# Patient Record
Sex: MALE | NOT HISPANIC OR LATINO | URBAN - NONMETROPOLITAN AREA
[De-identification: names, ages, dates, MRNs, and addresses within clinical notes are randomized per-mention and may not be internally consistent; named-entity substitution may affect disease eponyms.]

---

## 2018-01-19 ENCOUNTER — OFFICE VISIT CONVERTED (OUTPATIENT)
Dept: FAMILY MEDICINE CLINIC | Age: 83
End: 2018-01-19
Attending: FAMILY MEDICINE

## 2018-03-02 ENCOUNTER — OFFICE VISIT CONVERTED (OUTPATIENT)
Dept: FAMILY MEDICINE CLINIC | Age: 83
End: 2018-03-02
Attending: FAMILY MEDICINE

## 2018-08-28 ENCOUNTER — OFFICE VISIT CONVERTED (OUTPATIENT)
Dept: FAMILY MEDICINE CLINIC | Age: 83
End: 2018-08-28
Attending: FAMILY MEDICINE

## 2018-09-28 ENCOUNTER — OFFICE VISIT CONVERTED (OUTPATIENT)
Dept: FAMILY MEDICINE CLINIC | Age: 83
End: 2018-09-28
Attending: FAMILY MEDICINE

## 2019-01-17 ENCOUNTER — OFFICE VISIT CONVERTED (OUTPATIENT)
Dept: FAMILY MEDICINE CLINIC | Age: 84
End: 2019-01-17
Attending: FAMILY MEDICINE

## 2019-01-17 ENCOUNTER — HOSPITAL ENCOUNTER (OUTPATIENT)
Dept: OTHER | Facility: HOSPITAL | Age: 84
Discharge: HOME | End: 2019-01-17
Attending: FAMILY MEDICINE

## 2019-01-17 LAB
ALBUMIN SERPL-MCNC: 3.9 G/DL (ref 3.5–5)
ALBUMIN/GLOB SERPL: 1.2 {RATIO} (ref 1.4–2.6)
ALP SERPL-CCNC: 103 U/L (ref 56–155)
ALT SERPL-CCNC: 19 U/L (ref 10–40)
ANION GAP SERPL CALC-SCNC: 19 MMOL/L (ref 8–19)
AST SERPL-CCNC: 13 U/L (ref 15–50)
BASOPHILS # BLD MANUAL: 0.04 10*3/UL (ref 0–0.2)
BASOPHILS NFR BLD MANUAL: 0.4 % (ref 0–3)
BILIRUB SERPL-MCNC: 0.18 MG/DL (ref 0.2–1.3)
BNP SERPL-MCNC: 648 PG/ML (ref 0–1800)
BUN SERPL-MCNC: 50 MG/DL (ref 5–25)
BUN/CREAT SERPL: 20 {RATIO} (ref 6–20)
CALCIUM SERPL-MCNC: 9 MG/DL (ref 8.7–10.4)
CHLORIDE SERPL-SCNC: 98 MMOL/L (ref 99–111)
CHOLEST SERPL-MCNC: 204 MG/DL (ref 107–200)
CHOLEST/HDLC SERPL: 6.2 {RATIO} (ref 3–6)
CONV CO2: 20 MMOL/L (ref 22–32)
CONV CREATININE URINE, RANDOM: 134.9 MG/DL (ref 10–300)
CONV MICROALBUM.,U,RANDOM: 73.7 MG/L (ref 0–20)
CONV TOTAL PROTEIN: 7.1 G/DL (ref 6.3–8.2)
CREAT UR-MCNC: 2.44 MG/DL (ref 0.7–1.2)
DEPRECATED RDW RBC AUTO: 41.1 FL
EOSINOPHIL # BLD MANUAL: 0.41 10*3/UL (ref 0–0.7)
EOSINOPHIL NFR BLD MANUAL: 4.1 % (ref 0–7)
ERYTHROCYTE [DISTWIDTH] IN BLOOD BY AUTOMATED COUNT: 12.3 % (ref 11.5–14.5)
EST. AVERAGE GLUCOSE BLD GHB EST-MCNC: 252 MG/DL
GFR SERPLBLD BASED ON 1.73 SQ M-ARVRAT: 27 ML/MIN/{1.73_M2}
GLOBULIN UR ELPH-MCNC: 3.2 G/DL (ref 2–3.5)
GLUCOSE SERPL-MCNC: 436 MG/DL (ref 70–99)
GRANS (ABSOLUTE): 6.68 10*3/UL (ref 2–8)
GRANS: 66.6 % (ref 30–85)
HBA1C MFR BLD: 10.4 % (ref 3.5–5.7)
HBA1C MFR BLD: 10.5 G/DL (ref 14–18)
HCT VFR BLD AUTO: 31.9 % (ref 42–52)
HDLC SERPL-MCNC: 33 MG/DL (ref 40–60)
IMM GRANULOCYTES # BLD: 0.05 10*3/UL (ref 0–0.54)
IMM GRANULOCYTES NFR BLD: 0.5 % (ref 0–0.43)
LDLC SERPL CALC-MCNC: 122 MG/DL (ref 70–100)
LYMPHOCYTES # BLD MANUAL: 1.81 10*3/UL (ref 1–5)
LYMPHOCYTES NFR BLD MANUAL: 10.3 % (ref 3–10)
MCH RBC QN AUTO: 30.2 PG (ref 27–31)
MCHC RBC AUTO-ENTMCNC: 32.9 G/DL (ref 33–37)
MCV RBC AUTO: 91.7 FL (ref 80–96)
MICROALBUMIN/CREAT UR: 54.6 MG/G{CRE} (ref 0–25)
MONOCYTES # BLD AUTO: 1.03 10*3/UL (ref 0.2–1.2)
OSMOLALITY SERPL CALC.SUM OF ELEC: 306 MOSM/KG (ref 273–304)
PLATELET # BLD AUTO: 200 10*3/UL (ref 130–400)
PMV BLD AUTO: 10.3 FL (ref 7.4–10.4)
POTASSIUM SERPL-SCNC: 5.4 MMOL/L (ref 3.5–5.3)
RBC # BLD AUTO: 3.48 10*6/UL (ref 4.7–6.1)
SODIUM SERPL-SCNC: 132 MMOL/L (ref 135–147)
TRIGL SERPL-MCNC: 245 MG/DL (ref 40–150)
VARIANT LYMPHS NFR BLD MANUAL: 18.1 % (ref 20–45)
VLDLC SERPL-MCNC: 49 MG/DL (ref 5–37)
WBC # BLD AUTO: 10.02 10*3/UL (ref 4.8–10.8)

## 2019-01-30 ENCOUNTER — CONVERSION ENCOUNTER (OUTPATIENT)
Dept: CARDIOLOGY | Facility: CLINIC | Age: 84
End: 2019-01-30
Attending: INTERNAL MEDICINE

## 2019-02-18 ENCOUNTER — OFFICE VISIT CONVERTED (OUTPATIENT)
Dept: FAMILY MEDICINE CLINIC | Age: 84
End: 2019-02-18
Attending: FAMILY MEDICINE

## 2019-02-18 ENCOUNTER — HOSPITAL ENCOUNTER (OUTPATIENT)
Dept: OTHER | Facility: HOSPITAL | Age: 84
Discharge: HOME | End: 2019-02-18
Attending: FAMILY MEDICINE

## 2019-02-18 LAB
ALBUMIN SERPL-MCNC: 3.9 G/DL (ref 3.5–5)
ALBUMIN/GLOB SERPL: 1.2 {RATIO} (ref 1.4–2.6)
ALP SERPL-CCNC: 97 U/L (ref 56–155)
ALT SERPL-CCNC: 25 U/L (ref 10–40)
ANION GAP SERPL CALC-SCNC: 21 MMOL/L (ref 8–19)
AST SERPL-CCNC: 15 U/L (ref 15–50)
BASOPHILS # BLD MANUAL: 0.05 10*3/UL (ref 0–0.2)
BASOPHILS NFR BLD MANUAL: 0.5 % (ref 0–3)
BILIRUB SERPL-MCNC: 0.29 MG/DL (ref 0.2–1.3)
BUN SERPL-MCNC: 53 MG/DL (ref 5–25)
BUN/CREAT SERPL: 19 {RATIO} (ref 6–20)
CALCIUM SERPL-MCNC: 9 MG/DL (ref 8.7–10.4)
CHLORIDE SERPL-SCNC: 99 MMOL/L (ref 99–111)
CONV CO2: 17 MMOL/L (ref 22–32)
CONV TOTAL PROTEIN: 7.1 G/DL (ref 6.3–8.2)
CREAT UR-MCNC: 2.73 MG/DL (ref 0.7–1.2)
DEPRECATED RDW RBC AUTO: 43.3 FL
EOSINOPHIL # BLD MANUAL: 0.32 10*3/UL (ref 0–0.7)
EOSINOPHIL NFR BLD MANUAL: 3.1 % (ref 0–7)
ERYTHROCYTE [DISTWIDTH] IN BLOOD BY AUTOMATED COUNT: 12.6 % (ref 11.5–14.5)
GFR SERPLBLD BASED ON 1.73 SQ M-ARVRAT: 24 ML/MIN/{1.73_M2}
GLOBULIN UR ELPH-MCNC: 3.2 G/DL (ref 2–3.5)
GLUCOSE SERPL-MCNC: 378 MG/DL (ref 70–99)
GRANS (ABSOLUTE): 6.8 10*3/UL (ref 2–8)
GRANS: 66.3 % (ref 30–85)
HBA1C MFR BLD: 9.8 G/DL (ref 14–18)
HCT VFR BLD AUTO: 30 % (ref 42–52)
IMM GRANULOCYTES # BLD: 0.06 10*3/UL (ref 0–0.54)
IMM GRANULOCYTES NFR BLD: 0.6 % (ref 0–0.43)
LYMPHOCYTES # BLD MANUAL: 1.93 10*3/UL (ref 1–5)
LYMPHOCYTES NFR BLD MANUAL: 10.7 % (ref 3–10)
MCH RBC QN AUTO: 30.3 PG (ref 27–31)
MCHC RBC AUTO-ENTMCNC: 32.7 G/DL (ref 33–37)
MCV RBC AUTO: 92.9 FL (ref 80–96)
MONOCYTES # BLD AUTO: 1.1 10*3/UL (ref 0.2–1.2)
OSMOLALITY SERPL CALC.SUM OF ELEC: 304 MOSM/KG (ref 273–304)
PLATELET # BLD AUTO: 240 10*3/UL (ref 130–400)
PMV BLD AUTO: 9.9 FL (ref 7.4–10.4)
POTASSIUM SERPL-SCNC: 5.3 MMOL/L (ref 3.5–5.3)
RBC # BLD AUTO: 3.23 10*6/UL (ref 4.7–6.1)
SODIUM SERPL-SCNC: 132 MMOL/L (ref 135–147)
VARIANT LYMPHS NFR BLD MANUAL: 18.8 % (ref 20–45)
WBC # BLD AUTO: 10.26 10*3/UL (ref 4.8–10.8)

## 2019-04-01 ENCOUNTER — OFFICE VISIT CONVERTED (OUTPATIENT)
Dept: FAMILY MEDICINE CLINIC | Age: 84
End: 2019-04-01
Attending: FAMILY MEDICINE

## 2019-05-06 ENCOUNTER — HOSPITAL ENCOUNTER (OUTPATIENT)
Dept: OTHER | Facility: HOSPITAL | Age: 84
Discharge: HOME | End: 2019-05-06
Attending: FAMILY MEDICINE

## 2019-05-06 ENCOUNTER — OFFICE VISIT CONVERTED (OUTPATIENT)
Dept: FAMILY MEDICINE CLINIC | Age: 84
End: 2019-05-06
Attending: FAMILY MEDICINE

## 2019-05-06 LAB
ALBUMIN SERPL-MCNC: 4 G/DL (ref 3.5–5)
ALBUMIN/GLOB SERPL: 1.3 {RATIO} (ref 1.4–2.6)
ALP SERPL-CCNC: 234 U/L (ref 56–155)
ALT SERPL-CCNC: 135 U/L (ref 10–40)
ANION GAP SERPL CALC-SCNC: 18 MMOL/L (ref 8–19)
AST SERPL-CCNC: 61 U/L (ref 15–50)
BASOPHILS # BLD MANUAL: 0.04 10*3/UL (ref 0–0.2)
BASOPHILS NFR BLD MANUAL: 0.4 % (ref 0–3)
BILIRUB SERPL-MCNC: 0.36 MG/DL (ref 0.2–1.3)
BUN SERPL-MCNC: 36 MG/DL (ref 5–25)
BUN/CREAT SERPL: 16 {RATIO} (ref 6–20)
CALCIUM SERPL-MCNC: 9.1 MG/DL (ref 8.7–10.4)
CHLORIDE SERPL-SCNC: 103 MMOL/L (ref 99–111)
CHOLEST SERPL-MCNC: 139 MG/DL (ref 107–200)
CHOLEST/HDLC SERPL: 2.2 {RATIO} (ref 3–6)
CONV CO2: 20 MMOL/L (ref 22–32)
CONV CREATININE URINE, RANDOM: 92.1 MG/DL (ref 10–300)
CONV MICROALBUM.,U,RANDOM: 469.4 MG/L (ref 0–20)
CONV TOTAL PROTEIN: 7.2 G/DL (ref 6.3–8.2)
CREAT UR-MCNC: 2.26 MG/DL (ref 0.7–1.2)
DEPRECATED RDW RBC AUTO: 46.7 FL
EOSINOPHIL # BLD MANUAL: 0.42 10*3/UL (ref 0–0.7)
EOSINOPHIL NFR BLD MANUAL: 4.3 % (ref 0–7)
ERYTHROCYTE [DISTWIDTH] IN BLOOD BY AUTOMATED COUNT: 13.6 % (ref 11.5–14.5)
EST. AVERAGE GLUCOSE BLD GHB EST-MCNC: 143 MG/DL
GFR SERPLBLD BASED ON 1.73 SQ M-ARVRAT: 30 ML/MIN/{1.73_M2}
GLOBULIN UR ELPH-MCNC: 3.2 G/DL (ref 2–3.5)
GLUCOSE SERPL-MCNC: 205 MG/DL (ref 70–99)
GRANS (ABSOLUTE): 6.77 10*3/UL (ref 2–8)
GRANS: 68.5 % (ref 30–85)
HBA1C MFR BLD: 6.6 % (ref 3.5–5.7)
HBA1C MFR BLD: 9.2 G/DL (ref 14–18)
HCT VFR BLD AUTO: 29.1 % (ref 42–52)
HDLC SERPL-MCNC: 64 MG/DL (ref 40–60)
IMM GRANULOCYTES # BLD: 0.05 10*3/UL (ref 0–0.54)
IMM GRANULOCYTES NFR BLD: 0.5 % (ref 0–0.43)
LDLC SERPL CALC-MCNC: 57 MG/DL (ref 70–100)
LYMPHOCYTES # BLD MANUAL: 1.41 10*3/UL (ref 1–5)
LYMPHOCYTES NFR BLD MANUAL: 12 % (ref 3–10)
MCH RBC QN AUTO: 29.5 PG (ref 27–31)
MCHC RBC AUTO-ENTMCNC: 31.6 G/DL (ref 33–37)
MCV RBC AUTO: 93.3 FL (ref 80–96)
MICROALBUMIN/CREAT UR: 509.7 MG/G{CRE} (ref 0–25)
MONOCYTES # BLD AUTO: 1.18 10*3/UL (ref 0.2–1.2)
OSMOLALITY SERPL CALC.SUM OF ELEC: 296 MOSM/KG (ref 273–304)
PLATELET # BLD AUTO: 224 10*3/UL (ref 130–400)
PMV BLD AUTO: 9.9 FL (ref 7.4–10.4)
POTASSIUM SERPL-SCNC: 4.8 MMOL/L (ref 3.5–5.3)
RBC # BLD AUTO: 3.12 10*6/UL (ref 4.7–6.1)
SODIUM SERPL-SCNC: 136 MMOL/L (ref 135–147)
TRIGL SERPL-MCNC: 90 MG/DL (ref 40–150)
TSH SERPL-ACNC: 0.93 M[IU]/L (ref 0.27–4.2)
VARIANT LYMPHS NFR BLD MANUAL: 14.3 % (ref 20–45)
VLDLC SERPL-MCNC: 18 MG/DL (ref 5–37)
WBC # BLD AUTO: 9.87 10*3/UL (ref 4.8–10.8)

## 2019-05-07 ENCOUNTER — OFFICE VISIT CONVERTED (OUTPATIENT)
Dept: CARDIOLOGY | Facility: CLINIC | Age: 84
End: 2019-05-07
Attending: INTERNAL MEDICINE

## 2019-05-07 ENCOUNTER — CONVERSION ENCOUNTER (OUTPATIENT)
Dept: CARDIOLOGY | Facility: CLINIC | Age: 84
End: 2019-05-07

## 2019-05-10 ENCOUNTER — OFFICE VISIT CONVERTED (OUTPATIENT)
Dept: FAMILY MEDICINE CLINIC | Age: 84
End: 2019-05-10
Attending: FAMILY MEDICINE

## 2019-05-10 ENCOUNTER — HOSPITAL ENCOUNTER (OUTPATIENT)
Dept: OTHER | Facility: HOSPITAL | Age: 84
Discharge: HOME | End: 2019-05-10
Attending: FAMILY MEDICINE

## 2019-07-15 ENCOUNTER — HOSPITAL ENCOUNTER (OUTPATIENT)
Dept: OTHER | Facility: HOSPITAL | Age: 84
Discharge: HOME | End: 2019-07-15
Attending: INTERNAL MEDICINE

## 2019-07-15 LAB
25(OH)D3 SERPL-MCNC: 15 NG/ML (ref 30–100)
ALBUMIN SERPL-MCNC: 4.1 G/DL (ref 3.5–5)
ALBUMIN/GLOB SERPL: 1.2 {RATIO} (ref 1.4–2.6)
ALP SERPL-CCNC: 156 U/L (ref 56–155)
ALT SERPL-CCNC: 57 U/L (ref 10–40)
ANION GAP SERPL CALC-SCNC: 19 MMOL/L (ref 8–19)
APPEARANCE UR: ABNORMAL
AST SERPL-CCNC: 26 U/L (ref 15–50)
BASOPHILS # BLD MANUAL: 0.07 10*3/UL (ref 0–0.2)
BASOPHILS NFR BLD MANUAL: 0.5 % (ref 0–3)
BILIRUB SERPL-MCNC: 0.45 MG/DL (ref 0.2–1.3)
BILIRUB UR QL: NEGATIVE
BUN SERPL-MCNC: 28 MG/DL (ref 5–25)
BUN/CREAT SERPL: 15 {RATIO} (ref 6–20)
CALCIUM SERPL-MCNC: 9.4 MG/DL (ref 8.7–10.4)
CHLORIDE SERPL-SCNC: 97 MMOL/L (ref 99–111)
COLOR UR: YELLOW
CONV CO2: 23 MMOL/L (ref 22–32)
CONV CREATININE URINE, RANDOM: 75.6 MG/DL (ref 10–300)
CONV LEUKOCYTE ESTERASE: ABNORMAL
CONV MICROALBUM.,U,RANDOM: 2501.3 MG/L (ref 0–20)
CONV PROTEIN TO CREATININE RATIO (RANDOM URINE): 5.01 {RATIO} (ref 0–0.1)
CONV TOTAL PROTEIN: 7.5 G/DL (ref 6.3–8.2)
CONV UROBILINOGEN IN URINE BY AUTOMATED TEST STRIP: 0.2 {EHRLICHU}/DL (ref 0.1–1)
CREAT UR-MCNC: 1.91 MG/DL (ref 0.7–1.2)
DEPRECATED RDW RBC AUTO: 42.4 FL
EOSINOPHIL # BLD MANUAL: 0.27 10*3/UL (ref 0–0.7)
EOSINOPHIL NFR BLD MANUAL: 2 % (ref 0–7)
ERYTHROCYTE [DISTWIDTH] IN BLOOD BY AUTOMATED COUNT: 12.8 % (ref 11.5–14.5)
GFR SERPLBLD BASED ON 1.73 SQ M-ARVRAT: 37 ML/MIN/{1.73_M2}
GLOBULIN UR ELPH-MCNC: 3.4 G/DL (ref 2–3.5)
GLUCOSE 24H UR-MCNC: 500 MG/DL
GLUCOSE SERPL-MCNC: 353 MG/DL (ref 70–99)
GRANS (ABSOLUTE): 10.14 10*3/UL (ref 2–8)
GRANS: 76.5 % (ref 30–85)
HBA1C MFR BLD: 11.5 G/DL (ref 14–18)
HCT VFR BLD AUTO: 35.4 % (ref 42–52)
HGB UR QL STRIP: ABNORMAL
IMM GRANULOCYTES # BLD: 0.05 10*3/UL (ref 0–0.54)
IMM GRANULOCYTES NFR BLD: 0.4 % (ref 0–0.43)
IRON SATN MFR SERPL: 22 % (ref 20–55)
IRON SERPL-MCNC: 50 UG/DL (ref 70–180)
KETONES UR QL STRIP: NEGATIVE MG/DL
LYMPHOCYTES # BLD MANUAL: 1.61 10*3/UL (ref 1–5)
LYMPHOCYTES NFR BLD MANUAL: 8.5 % (ref 3–10)
MCH RBC QN AUTO: 29.6 PG (ref 27–31)
MCHC RBC AUTO-ENTMCNC: 32.5 G/DL (ref 33–37)
MCV RBC AUTO: 91 FL (ref 80–96)
MICROALBUMIN/CREAT UR: 3308.6 MG/G{CRE} (ref 0–25)
MONOCYTES # BLD AUTO: 1.13 10*3/UL (ref 0.2–1.2)
NITRITE UR-MCNC: NEGATIVE MG/ML
OSMOLALITY SERPL CALC.SUM OF ELEC: 298 MOSM/KG (ref 273–304)
PH UR STRIP.AUTO: 5.5 [PH] (ref 5–8)
PLATELET # BLD AUTO: 249 10*3/UL (ref 130–400)
PMV BLD AUTO: 10.1 FL (ref 7.4–10.4)
POTASSIUM SERPL-SCNC: 4.7 MMOL/L (ref 3.5–5.3)
PROT UR-MCNC: 378.5 MG/DL
PROT UR-MCNC: >=300 MG/DL
RBC # BLD AUTO: 3.89 10*6/UL (ref 4.7–6.1)
SODIUM SERPL-SCNC: 134 MMOL/L (ref 135–147)
SP GR UR STRIP: 1.02 (ref 1–1.03)
SPECIMEN SOURCE: ABNORMAL
TIBC SERPL-MCNC: 226 UG/DL (ref 245–450)
TRANSFERRIN SERPL-MCNC: 158 MG/DL (ref 215–365)
VARIANT LYMPHS NFR BLD MANUAL: 12.1 % (ref 20–45)
WBC # BLD AUTO: 13.27 10*3/UL (ref 4.8–10.8)

## 2019-08-26 ENCOUNTER — HOSPITAL ENCOUNTER (OUTPATIENT)
Dept: OTHER | Facility: HOSPITAL | Age: 84
Discharge: HOME | End: 2019-08-26
Attending: FAMILY MEDICINE

## 2019-08-26 ENCOUNTER — OFFICE VISIT CONVERTED (OUTPATIENT)
Dept: FAMILY MEDICINE CLINIC | Age: 84
End: 2019-08-26
Attending: FAMILY MEDICINE

## 2019-08-26 LAB
ALBUMIN SERPL-MCNC: 4.1 G/DL (ref 3.5–5)
ALBUMIN/GLOB SERPL: 1.2 {RATIO} (ref 1.4–2.6)
ALP SERPL-CCNC: 129 U/L (ref 56–155)
ALT SERPL-CCNC: 48 U/L (ref 10–40)
ANION GAP SERPL CALC-SCNC: 18 MMOL/L (ref 8–19)
AST SERPL-CCNC: 23 U/L (ref 15–50)
BASOPHILS # BLD MANUAL: 0.06 10*3/UL (ref 0–0.2)
BASOPHILS NFR BLD MANUAL: 0.6 % (ref 0–3)
BILIRUB SERPL-MCNC: 0.29 MG/DL (ref 0.2–1.3)
BNP SERPL-MCNC: 682 PG/ML (ref 0–1800)
BUN SERPL-MCNC: 47 MG/DL (ref 5–25)
BUN/CREAT SERPL: 19 {RATIO} (ref 6–20)
CALCIUM SERPL-MCNC: 9.3 MG/DL (ref 8.7–10.4)
CHLORIDE SERPL-SCNC: 98 MMOL/L (ref 99–111)
CONV CO2: 22 MMOL/L (ref 22–32)
CONV TOTAL PROTEIN: 7.5 G/DL (ref 6.3–8.2)
CREAT UR-MCNC: 2.42 MG/DL (ref 0.7–1.2)
DEPRECATED RDW RBC AUTO: 42 FL
EOSINOPHIL # BLD MANUAL: 0.57 10*3/UL (ref 0–0.7)
EOSINOPHIL NFR BLD MANUAL: 5.8 % (ref 0–7)
ERYTHROCYTE [DISTWIDTH] IN BLOOD BY AUTOMATED COUNT: 12.8 % (ref 11.5–14.5)
EST. AVERAGE GLUCOSE BLD GHB EST-MCNC: 223 MG/DL
GFR SERPLBLD BASED ON 1.73 SQ M-ARVRAT: 28 ML/MIN/{1.73_M2}
GLOBULIN UR ELPH-MCNC: 3.4 G/DL (ref 2–3.5)
GLUCOSE SERPL-MCNC: 339 MG/DL (ref 70–99)
GRANS (ABSOLUTE): 6.68 10*3/UL (ref 2–8)
GRANS: 68.1 % (ref 30–85)
HBA1C MFR BLD: 9.4 % (ref 3.5–5.7)
HBA1C MFR BLD: 9.8 G/DL (ref 14–18)
HCT VFR BLD AUTO: 30 % (ref 42–52)
IMM GRANULOCYTES # BLD: 0.04 10*3/UL (ref 0–0.54)
IMM GRANULOCYTES NFR BLD: 0.4 % (ref 0–0.43)
LYMPHOCYTES # BLD MANUAL: 1.26 10*3/UL (ref 1–5)
LYMPHOCYTES NFR BLD MANUAL: 12.3 % (ref 3–10)
MCH RBC QN AUTO: 29.7 PG (ref 27–31)
MCHC RBC AUTO-ENTMCNC: 32.7 G/DL (ref 33–37)
MCV RBC AUTO: 90.9 FL (ref 80–96)
MONOCYTES # BLD AUTO: 1.21 10*3/UL (ref 0.2–1.2)
OSMOLALITY SERPL CALC.SUM OF ELEC: 302 MOSM/KG (ref 273–304)
PLATELET # BLD AUTO: 211 10*3/UL (ref 130–400)
PMV BLD AUTO: 9.9 FL (ref 7.4–10.4)
POTASSIUM SERPL-SCNC: 4.5 MMOL/L (ref 3.5–5.3)
RBC # BLD AUTO: 3.3 10*6/UL (ref 4.7–6.1)
SODIUM SERPL-SCNC: 133 MMOL/L (ref 135–147)
VARIANT LYMPHS NFR BLD MANUAL: 12.8 % (ref 20–45)
WBC # BLD AUTO: 9.82 10*3/UL (ref 4.8–10.8)

## 2019-09-23 ENCOUNTER — HOSPITAL ENCOUNTER (OUTPATIENT)
Dept: OTHER | Facility: HOSPITAL | Age: 84
Discharge: HOME | End: 2019-09-23
Attending: FAMILY MEDICINE

## 2019-09-23 ENCOUNTER — OFFICE VISIT CONVERTED (OUTPATIENT)
Dept: FAMILY MEDICINE CLINIC | Age: 84
End: 2019-09-23
Attending: FAMILY MEDICINE

## 2019-09-23 LAB
ALBUMIN SERPL-MCNC: 4.1 G/DL (ref 3.5–5)
ALBUMIN/GLOB SERPL: 1.1 {RATIO} (ref 1.4–2.6)
ALP SERPL-CCNC: 122 U/L (ref 56–155)
ALT SERPL-CCNC: 45 U/L (ref 10–40)
ANION GAP SERPL CALC-SCNC: 25 MMOL/L (ref 8–19)
AST SERPL-CCNC: 23 U/L (ref 15–50)
BASOPHILS # BLD MANUAL: 0.05 10*3/UL (ref 0–0.2)
BASOPHILS NFR BLD MANUAL: 0.4 % (ref 0–3)
BILIRUB SERPL-MCNC: 0.7 MG/DL (ref 0.2–1.3)
BNP SERPL-MCNC: 385 PG/ML (ref 0–1800)
BUN SERPL-MCNC: 107 MG/DL (ref 5–25)
BUN/CREAT SERPL: 26 {RATIO} (ref 6–20)
CALCIUM SERPL-MCNC: 9.4 MG/DL (ref 8.7–10.4)
CHLORIDE SERPL-SCNC: 88 MMOL/L (ref 99–111)
CONV CO2: 17 MMOL/L (ref 22–32)
CONV TOTAL PROTEIN: 7.8 G/DL (ref 6.3–8.2)
CREAT UR-MCNC: 4.05 MG/DL (ref 0.7–1.2)
DEPRECATED RDW RBC AUTO: 43.4 FL
EOSINOPHIL # BLD MANUAL: 0.67 10*3/UL (ref 0–0.7)
EOSINOPHIL NFR BLD MANUAL: 5 % (ref 0–7)
ERYTHROCYTE [DISTWIDTH] IN BLOOD BY AUTOMATED COUNT: 12.8 % (ref 11.5–14.5)
GFR SERPLBLD BASED ON 1.73 SQ M-ARVRAT: 15 ML/MIN/{1.73_M2}
GLOBULIN UR ELPH-MCNC: 3.7 G/DL (ref 2–3.5)
GLUCOSE SERPL-MCNC: 554 MG/DL (ref 70–99)
GRANS (ABSOLUTE): 10.13 10*3/UL (ref 2–8)
GRANS: 75.3 % (ref 30–85)
HBA1C MFR BLD: 9.3 G/DL (ref 14–18)
HCT VFR BLD AUTO: 28.5 % (ref 42–52)
IMM GRANULOCYTES # BLD: 0.1 10*3/UL (ref 0–0.54)
IMM GRANULOCYTES NFR BLD: 0.7 % (ref 0–0.43)
LYMPHOCYTES # BLD MANUAL: 1.26 10*3/UL (ref 1–5)
LYMPHOCYTES NFR BLD MANUAL: 9.2 % (ref 3–10)
MCH RBC QN AUTO: 30 PG (ref 27–31)
MCHC RBC AUTO-ENTMCNC: 32.6 G/DL (ref 33–37)
MCV RBC AUTO: 91.9 FL (ref 80–96)
MONOCYTES # BLD AUTO: 1.23 10*3/UL (ref 0.2–1.2)
OSMOLALITY SERPL CALC.SUM OF ELEC: 319 MOSM/KG (ref 273–304)
PLATELET # BLD AUTO: 236 10*3/UL (ref 130–400)
PMV BLD AUTO: 10.8 FL (ref 7.4–10.4)
POTASSIUM SERPL-SCNC: 5.2 MMOL/L (ref 3.5–5.3)
RBC # BLD AUTO: 3.1 10*6/UL (ref 4.7–6.1)
SODIUM SERPL-SCNC: 125 MMOL/L (ref 135–147)
VARIANT LYMPHS NFR BLD MANUAL: 9.4 % (ref 20–45)
WBC # BLD AUTO: 13.44 10*3/UL (ref 4.8–10.8)

## 2019-11-12 ENCOUNTER — HOSPITAL ENCOUNTER (OUTPATIENT)
Dept: OTHER | Facility: HOSPITAL | Age: 84
Discharge: HOME | End: 2019-11-12
Attending: FAMILY MEDICINE

## 2019-11-12 ENCOUNTER — OFFICE VISIT CONVERTED (OUTPATIENT)
Dept: FAMILY MEDICINE CLINIC | Age: 84
End: 2019-11-12
Attending: FAMILY MEDICINE

## 2019-11-12 LAB
ALBUMIN SERPL-MCNC: 4.3 G/DL (ref 3.5–5)
ALBUMIN/GLOB SERPL: 1.3 {RATIO} (ref 1.4–2.6)
ALP SERPL-CCNC: 98 U/L (ref 56–155)
ALT SERPL-CCNC: 40 U/L (ref 10–40)
ANION GAP SERPL CALC-SCNC: 23 MMOL/L (ref 8–19)
AST SERPL-CCNC: 22 U/L (ref 15–50)
BASOPHILS # BLD AUTO: 0.06 10*3/UL (ref 0–0.2)
BASOPHILS NFR BLD AUTO: 0.6 % (ref 0–3)
BILIRUB SERPL-MCNC: 0.26 MG/DL (ref 0.2–1.3)
BNP SERPL-MCNC: 557 PG/ML (ref 0–1800)
BUN SERPL-MCNC: 70 MG/DL (ref 5–25)
BUN/CREAT SERPL: 21 {RATIO} (ref 6–20)
CALCIUM SERPL-MCNC: 9.4 MG/DL (ref 8.7–10.4)
CHLORIDE SERPL-SCNC: 99 MMOL/L (ref 99–111)
CONV ABS IMM GRAN: 0.04 10*3/UL (ref 0–0.2)
CONV CO2: 18 MMOL/L (ref 22–32)
CONV IMMATURE GRAN: 0.4 % (ref 0–1.8)
CONV TOTAL PROTEIN: 7.5 G/DL (ref 6.3–8.2)
CREAT UR-MCNC: 3.26 MG/DL (ref 0.7–1.2)
DEPRECATED RDW RBC AUTO: 44 FL (ref 35.1–43.9)
EOSINOPHIL # BLD AUTO: 0.7 10*3/UL (ref 0–0.7)
EOSINOPHIL # BLD AUTO: 6.7 % (ref 0–7)
ERYTHROCYTE [DISTWIDTH] IN BLOOD BY AUTOMATED COUNT: 12.2 % (ref 11.6–14.4)
EST. AVERAGE GLUCOSE BLD GHB EST-MCNC: 171 MG/DL
FERRITIN SERPL-MCNC: 682 NG/ML (ref 30–300)
FOLATE SERPL-MCNC: 10 NG/ML (ref 4.8–20)
GFR SERPLBLD BASED ON 1.73 SQ M-ARVRAT: 19 ML/MIN/{1.73_M2}
GLOBULIN UR ELPH-MCNC: 3.2 G/DL (ref 2–3.5)
GLUCOSE SERPL-MCNC: 191 MG/DL (ref 70–99)
HBA1C MFR BLD: 7.6 % (ref 3.5–5.7)
HCT VFR BLD AUTO: 31.5 % (ref 42–52)
HGB BLD-MCNC: 9.7 G/DL (ref 14–18)
IRON SATN MFR SERPL: 22 % (ref 20–55)
IRON SERPL-MCNC: 55 UG/DL (ref 70–180)
LYMPHOCYTES # BLD AUTO: 1.04 10*3/UL (ref 1–5)
LYMPHOCYTES NFR BLD AUTO: 10 % (ref 20–45)
MCH RBC QN AUTO: 30.2 PG (ref 27–31)
MCHC RBC AUTO-ENTMCNC: 30.8 G/DL (ref 33–37)
MCV RBC AUTO: 98.1 FL (ref 80–96)
MONOCYTES # BLD AUTO: 1.22 10*3/UL (ref 0.2–1.2)
MONOCYTES NFR BLD AUTO: 11.7 % (ref 3–10)
NEUTROPHILS # BLD AUTO: 7.39 10*3/UL (ref 2–8)
NEUTROPHILS NFR BLD AUTO: 70.6 % (ref 30–85)
NRBC CBCN: 0 % (ref 0–0.7)
OSMOLALITY SERPL CALC.SUM OF ELEC: 306 MOSM/KG (ref 273–304)
PLATELET # BLD AUTO: 194 10*3/UL (ref 130–400)
PMV BLD AUTO: 10.3 FL (ref 9.4–12.4)
POTASSIUM SERPL-SCNC: 4.9 MMOL/L (ref 3.5–5.3)
RBC # BLD AUTO: 3.21 10*6/UL (ref 4.7–6.1)
RETICS # AUTO: 0.08 10*6/UL (ref 0.03–0.1)
RETICS/RBC NFR AUTO: 2.56 % (ref 0.51–1.81)
SODIUM SERPL-SCNC: 135 MMOL/L (ref 135–147)
TIBC SERPL-MCNC: 249 UG/DL (ref 245–450)
TRANSFERRIN SERPL-MCNC: 174 MG/DL (ref 215–365)
TSH SERPL-ACNC: 1.12 M[IU]/L (ref 0.27–4.2)
VIT B12 SERPL-MCNC: 494 PG/ML (ref 211–911)
WBC # BLD AUTO: 10.45 10*3/UL (ref 4.8–10.8)

## 2019-12-17 ENCOUNTER — HOSPITAL ENCOUNTER (OUTPATIENT)
Dept: OTHER | Facility: HOSPITAL | Age: 84
Discharge: HOME | End: 2019-12-17
Attending: FAMILY MEDICINE

## 2019-12-17 ENCOUNTER — OFFICE VISIT CONVERTED (OUTPATIENT)
Dept: FAMILY MEDICINE CLINIC | Age: 84
End: 2019-12-17
Attending: FAMILY MEDICINE

## 2019-12-17 LAB
ALBUMIN SERPL-MCNC: 3.5 G/DL (ref 3.5–5)
ALBUMIN/GLOB SERPL: 1.1 {RATIO} (ref 1.4–2.6)
ALP SERPL-CCNC: 107 U/L (ref 56–155)
ALT SERPL-CCNC: 99 U/L (ref 10–40)
ANION GAP SERPL CALC-SCNC: 18 MMOL/L (ref 8–19)
AST SERPL-CCNC: 55 U/L (ref 15–50)
BASOPHILS # BLD MANUAL: 0.08 10*3/UL (ref 0–0.2)
BASOPHILS NFR BLD MANUAL: 0.5 % (ref 0–3)
BILIRUB SERPL-MCNC: 0.43 MG/DL (ref 0.2–1.3)
BUN SERPL-MCNC: 27 MG/DL (ref 5–25)
BUN/CREAT SERPL: 11 {RATIO} (ref 6–20)
CALCIUM SERPL-MCNC: 9.1 MG/DL (ref 8.7–10.4)
CHLORIDE SERPL-SCNC: 100 MMOL/L (ref 99–111)
CONV CO2: 23 MMOL/L (ref 22–32)
CONV TOTAL PROTEIN: 6.7 G/DL (ref 6.3–8.2)
CREAT UR-MCNC: 2.44 MG/DL (ref 0.7–1.2)
DEPRECATED RDW RBC AUTO: 46.4 FL
EOSINOPHIL # BLD MANUAL: 0.48 10*3/UL (ref 0–0.7)
EOSINOPHIL NFR BLD MANUAL: 2.8 % (ref 0–7)
ERYTHROCYTE [DISTWIDTH] IN BLOOD BY AUTOMATED COUNT: 13 % (ref 11.5–14.5)
GFR SERPLBLD BASED ON 1.73 SQ M-ARVRAT: 27 ML/MIN/{1.73_M2}
GLOBULIN UR ELPH-MCNC: 3.2 G/DL (ref 2–3.5)
GLUCOSE SERPL-MCNC: 177 MG/DL (ref 70–99)
GRANS (ABSOLUTE): 14.15 10*3/UL (ref 2–8)
GRANS: 81.7 % (ref 30–85)
HBA1C MFR BLD: 8.7 G/DL (ref 14–18)
HCT VFR BLD AUTO: 27.4 % (ref 42–52)
IMM GRANULOCYTES # BLD: 0.14 10*3/UL (ref 0–0.54)
IMM GRANULOCYTES NFR BLD: 0.8 % (ref 0–0.43)
LYMPHOCYTES # BLD MANUAL: 0.97 10*3/UL (ref 1–5)
LYMPHOCYTES NFR BLD MANUAL: 8.6 % (ref 3–10)
MCH RBC QN AUTO: 30.6 PG (ref 27–31)
MCHC RBC AUTO-ENTMCNC: 31.8 G/DL (ref 33–37)
MCV RBC AUTO: 96.5 FL (ref 80–96)
MONOCYTES # BLD AUTO: 1.48 10*3/UL (ref 0.2–1.2)
OSMOLALITY SERPL CALC.SUM OF ELEC: 293 MOSM/KG (ref 273–304)
PLATELET # BLD AUTO: 343 10*3/UL (ref 130–400)
PMV BLD AUTO: 10 FL (ref 7.4–10.4)
POTASSIUM SERPL-SCNC: 4 MMOL/L (ref 3.5–5.3)
RBC # BLD AUTO: 2.84 10*6/UL (ref 4.7–6.1)
SODIUM SERPL-SCNC: 137 MMOL/L (ref 135–147)
VARIANT LYMPHS NFR BLD MANUAL: 5.6 % (ref 20–45)
WBC # BLD AUTO: 17.3 10*3/UL (ref 4.8–10.8)

## 2020-01-27 ENCOUNTER — OFFICE VISIT CONVERTED (OUTPATIENT)
Dept: FAMILY MEDICINE CLINIC | Age: 85
End: 2020-01-27
Attending: FAMILY MEDICINE

## 2020-05-27 ENCOUNTER — OFFICE VISIT CONVERTED (OUTPATIENT)
Dept: FAMILY MEDICINE CLINIC | Age: 85
End: 2020-05-27
Attending: FAMILY MEDICINE

## 2020-05-27 ENCOUNTER — HOSPITAL ENCOUNTER (OUTPATIENT)
Dept: OTHER | Facility: HOSPITAL | Age: 85
Discharge: HOME | End: 2020-05-27
Attending: FAMILY MEDICINE

## 2020-05-27 LAB
ALBUMIN SERPL-MCNC: 4 G/DL (ref 3.5–5)
ALBUMIN/GLOB SERPL: 1.3 {RATIO} (ref 1.4–2.6)
ALP SERPL-CCNC: 125 U/L (ref 56–155)
ALT SERPL-CCNC: 50 U/L (ref 10–40)
ANION GAP SERPL CALC-SCNC: 16 MMOL/L (ref 8–19)
AST SERPL-CCNC: 22 U/L (ref 15–50)
BASOPHILS # BLD MANUAL: 0.05 10*3/UL (ref 0–0.2)
BASOPHILS NFR BLD MANUAL: 0.6 % (ref 0–3)
BILIRUB SERPL-MCNC: 0.41 MG/DL (ref 0.2–1.3)
BUN SERPL-MCNC: 28 MG/DL (ref 5–25)
BUN/CREAT SERPL: 11 {RATIO} (ref 6–20)
CALCIUM SERPL-MCNC: 9 MG/DL (ref 8.7–10.4)
CHLORIDE SERPL-SCNC: 101 MMOL/L (ref 99–111)
CONV CO2: 20 MMOL/L (ref 22–32)
CONV TOTAL PROTEIN: 7.2 G/DL (ref 6.3–8.2)
CREAT UR-MCNC: 2.46 MG/DL (ref 0.7–1.2)
DEPRECATED RDW RBC AUTO: 41.4 FL
EOSINOPHIL # BLD MANUAL: 0.29 10*3/UL (ref 0–0.7)
EOSINOPHIL NFR BLD MANUAL: 3.5 % (ref 0–7)
ERYTHROCYTE [DISTWIDTH] IN BLOOD BY AUTOMATED COUNT: 11.9 % (ref 11.5–14.5)
EST. AVERAGE GLUCOSE BLD GHB EST-MCNC: 169 MG/DL
GFR SERPLBLD BASED ON 1.73 SQ M-ARVRAT: 27 ML/MIN/{1.73_M2}
GLOBULIN UR ELPH-MCNC: 3.2 G/DL (ref 2–3.5)
GLUCOSE SERPL-MCNC: 246 MG/DL (ref 70–99)
GRANS (ABSOLUTE): 5.3 10*3/UL (ref 2–8)
GRANS: 64.2 % (ref 30–85)
HBA1C MFR BLD: 11.1 G/DL (ref 14–18)
HBA1C MFR BLD: 7.5 % (ref 3.5–5.7)
HCT VFR BLD AUTO: 33.9 % (ref 42–52)
IMM GRANULOCYTES # BLD: 0.03 10*3/UL (ref 0–0.54)
IMM GRANULOCYTES NFR BLD: 0.4 % (ref 0–0.43)
LYMPHOCYTES # BLD MANUAL: 1.66 10*3/UL (ref 1–5)
LYMPHOCYTES NFR BLD MANUAL: 11.2 % (ref 3–10)
MCH RBC QN AUTO: 30.4 PG (ref 27–31)
MCHC RBC AUTO-ENTMCNC: 32.7 G/DL (ref 33–37)
MCV RBC AUTO: 92.9 FL (ref 80–96)
MONOCYTES # BLD AUTO: 0.92 10*3/UL (ref 0.2–1.2)
OSMOLALITY SERPL CALC.SUM OF ELEC: 288 MOSM/KG (ref 273–304)
PLATELET # BLD AUTO: 196 10*3/UL (ref 130–400)
PMV BLD AUTO: 9.7 FL (ref 7.4–10.4)
POTASSIUM SERPL-SCNC: 4.6 MMOL/L (ref 3.5–5.3)
RBC # BLD AUTO: 3.65 10*6/UL (ref 4.7–6.1)
SODIUM SERPL-SCNC: 132 MMOL/L (ref 135–147)
TSH SERPL-ACNC: 1.04 M[IU]/L (ref 0.27–4.2)
VARIANT LYMPHS NFR BLD MANUAL: 20.1 % (ref 20–45)
WBC # BLD AUTO: 8.25 10*3/UL (ref 4.8–10.8)

## 2020-10-01 ENCOUNTER — HOSPITAL ENCOUNTER (OUTPATIENT)
Dept: OTHER | Facility: HOSPITAL | Age: 85
Discharge: HOME | End: 2020-10-01
Attending: FAMILY MEDICINE

## 2020-10-01 ENCOUNTER — OFFICE VISIT CONVERTED (OUTPATIENT)
Dept: FAMILY MEDICINE CLINIC | Age: 85
End: 2020-10-01
Attending: FAMILY MEDICINE

## 2020-10-01 LAB
BASOPHILS # BLD AUTO: 0.12 10*3/UL (ref 0–0.2)
BASOPHILS NFR BLD AUTO: 0.9 % (ref 0–3)
CONV ABS IMM GRAN: 0.08 10*3/UL (ref 0–0.2)
CONV IMMATURE GRAN: 0.6 % (ref 0–1.8)
DEPRECATED RDW RBC AUTO: 49 FL (ref 35.1–43.9)
EOSINOPHIL # BLD AUTO: 0.56 10*3/UL (ref 0–0.7)
EOSINOPHIL # BLD AUTO: 4.4 % (ref 0–7)
ERYTHROCYTE [DISTWIDTH] IN BLOOD BY AUTOMATED COUNT: 14.2 % (ref 11.6–14.4)
EST. AVERAGE GLUCOSE BLD GHB EST-MCNC: 157 MG/DL
HBA1C MFR BLD: 7.1 % (ref 3.5–5.7)
HCT VFR BLD AUTO: 28.9 % (ref 42–52)
HGB BLD-MCNC: 9 G/DL (ref 14–18)
LYMPHOCYTES # BLD AUTO: 1.96 10*3/UL (ref 1–5)
LYMPHOCYTES NFR BLD AUTO: 15.3 % (ref 20–45)
MCH RBC QN AUTO: 29.6 PG (ref 27–31)
MCHC RBC AUTO-ENTMCNC: 31.1 G/DL (ref 33–37)
MCV RBC AUTO: 95.1 FL (ref 80–96)
MONOCYTES # BLD AUTO: 1.09 10*3/UL (ref 0.2–1.2)
MONOCYTES NFR BLD AUTO: 8.5 % (ref 3–10)
NEUTROPHILS # BLD AUTO: 8.99 10*3/UL (ref 2–8)
NEUTROPHILS NFR BLD AUTO: 70.3 % (ref 30–85)
NRBC CBCN: 0 % (ref 0–0.7)
PLATELET # BLD AUTO: 215 10*3/UL (ref 130–400)
PMV BLD AUTO: 11.5 FL (ref 9.4–12.4)
RBC # BLD AUTO: 3.04 10*6/UL (ref 4.7–6.1)
RETICS # AUTO: 0.07 10*6/UL (ref 0.03–0.1)
RETICS/RBC NFR AUTO: 2.46 % (ref 0.51–1.81)
WBC # BLD AUTO: 12.8 10*3/UL (ref 4.8–10.8)

## 2020-10-02 LAB
ALBUMIN SERPL-MCNC: 3.9 G/DL (ref 3.5–5)
ALBUMIN/GLOB SERPL: 1.2 {RATIO} (ref 1.4–2.6)
ALP SERPL-CCNC: 142 U/L (ref 56–155)
ALT SERPL-CCNC: 36 U/L (ref 10–40)
ANION GAP SERPL CALC-SCNC: 19 MMOL/L (ref 8–19)
AST SERPL-CCNC: 22 U/L (ref 15–50)
BILIRUB SERPL-MCNC: 0.35 MG/DL (ref 0.2–1.3)
BNP SERPL-MCNC: 2156 PG/ML (ref 0–1800)
BUN SERPL-MCNC: 47 MG/DL (ref 5–25)
BUN/CREAT SERPL: 15 {RATIO} (ref 6–20)
CALCIUM SERPL-MCNC: 9 MG/DL (ref 8.7–10.4)
CHLORIDE SERPL-SCNC: 99 MMOL/L (ref 99–111)
CONV CO2: 21 MMOL/L (ref 22–32)
CONV TOTAL PROTEIN: 7.2 G/DL (ref 6.3–8.2)
CREAT UR-MCNC: 3.15 MG/DL (ref 0.7–1.2)
FERRITIN SERPL-MCNC: 619 NG/ML (ref 30–300)
FOLATE SERPL-MCNC: 13.4 NG/ML (ref 4.8–20)
GFR SERPLBLD BASED ON 1.73 SQ M-ARVRAT: 20 ML/MIN/{1.73_M2}
GLOBULIN UR ELPH-MCNC: 3.3 G/DL (ref 2–3.5)
GLUCOSE SERPL-MCNC: 212 MG/DL (ref 70–99)
IRON SATN MFR SERPL: 17 % (ref 20–55)
IRON SERPL-MCNC: 40 UG/DL (ref 70–180)
OSMOLALITY SERPL CALC.SUM OF ELEC: 297 MOSM/KG (ref 273–304)
POTASSIUM SERPL-SCNC: 5.2 MMOL/L (ref 3.5–5.3)
SODIUM SERPL-SCNC: 134 MMOL/L (ref 135–147)
TIBC SERPL-MCNC: 240 UG/DL (ref 245–450)
TRANSFERRIN SERPL-MCNC: 168 MG/DL (ref 215–365)
TSH SERPL-ACNC: 1.03 M[IU]/L (ref 0.27–4.2)
VIT B12 SERPL-MCNC: 1555 PG/ML (ref 211–911)

## 2021-05-15 VITALS
WEIGHT: 226 LBS | HEIGHT: 66 IN | DIASTOLIC BLOOD PRESSURE: 62 MMHG | SYSTOLIC BLOOD PRESSURE: 174 MMHG | HEART RATE: 68 BPM | BODY MASS INDEX: 36.32 KG/M2

## 2021-05-18 NOTE — PROGRESS NOTES
Carlos Delgado  1935     Office/Outpatient Visit    Visit Date: Tue, Dec 17, 2019 09:50 am    Provider: Tommy Garduno MD (Assistant: Alondra Magallanes MA)    Location: [facility]        Electronically signed by Tommy Garduno MD on  12/17/2019 08:22:26 PM                             Subjective:        CC: Mr. Delgado is a 84 year old Black or  male.  He is here today following a transition of care from an inpatient hospital: [facility]. The patient was admitted on 12/4-11/2019. The patient was admitted for Pneumonia. Our office called the patient within 48 hours of discharge and scheduled the follow-up appointment. During the patient's hospital stay the patient was treated by Dr. Alcala. Medications have been reviewed and reconciled with discharge summary..          HPI:       BP today is 124/58 with a HR of 74. Upon d/c from [facility], Dr. Escalante continued amlodipine, coreg, and imdur. He d/c'd clonidine, lasix, hydralazine, and lisinopril. Since BP was elevated back at [facility], I advised to restart lisinopril. Pt has also been on lasix and HCTZ 12.5 mg qd.      A1c this year has been 10.4 -> 6.6 -> 9.4 -> 7.6 on 11/12/19. His DM 2 medications were changed during hospitalization as he was not eating much during that time so glucose was controlled with SSI. Upon discharge Lantus 40 units BID and januvia were d/c'd. He was advised to continue glipizide 10 mg BID only. I was contacted by hospital nurse who was concerned about drastic cuts to DM 2 and HTN meds. I advised to restart Lantus 40 units BID. He is checking glucose BID and glucose tends to be low in the morning and in the 100-200s during day and night. He appetite has gradually improved. He has continued to take novolog 5 units TID.      Pt was admitted to [facility] from 12/4-12/11 (transferred from [facility]) for AMS 2/2 sepsis with bacteremia, UTI (e.coli) and RANJIT on CKD. Improved with IVF and rocephin. He met [facility]  criteria with WBC 28, temp of 102, and blood cultures (presumably from Flaget were positive for bacteria per [facility] D/c/ summary). Hospitalization was for life-threatening sepsis. CXR read as possible PNA vs atelectasis. He was discharged home with amoxicllin. He reports feeling pretty good. He has felt pretty other than the first couple days in the hospital.    ROS:     CONSTITUTIONAL:  Negative for fatigue and fever.      E/N/T:  Negative for diminished hearing and nasal congestion.      CARDIOVASCULAR:  Negative for chest pain and palpitations.      RESPIRATORY:  Positive for dyspnea ( with moderate exertion ).   Negative for recent cough.      GASTROINTESTINAL:  Positive for anorexia ( decreased appetite ), diarrhea and change in stool caliber.   Negative for abdominal pain, constipation, hematochezia ( resolved ), nausea or vomiting.      MUSCULOSKELETAL:  Negative for arthralgias and myalgias.      INTEGUMENTARY:  Positive for fungal nail infection (toenails).      NEUROLOGICAL:  Positive for paresthesias.   Negative for weakness.      PSYCHIATRIC:  Negative for anxiety, depression and sleep disturbance.          Past Medical History / Family History / Social History:         Last Reviewed on 11/12/2019 06:33 PM by Tommy Garduno    Past Medical History:             PAST MEDICAL HISTORY         Chronic Renal Failure: Stage 4;     Colon cancer         CURRENT MEDICAL PROVIDERS:    Nephrologist: Dr. rFausto         PREVENTIVE HEALTH MAINTENANCE             COLORECTAL CANCER SCREENING:; colonoscopy with the following abnormalities noted-- 1 tubular adenoma and Polyp(s); 6/17/19 - Dr. Mariscal         Surgical History:         Positive for    Cataract Removal: bilateral; ;     Positive for    Colonoscopy ( 2016;; );         Family History:     Family Medical History Unremarkable         Social History:     Occupation:    Retired         Tobacco/Alcohol/Supplements:     Last Reviewed on 11/12/2019 06:33 PM by  Tommy Garduno    Tobacco: He has a past history of cigarette smoking; quit date:  1975.  Non-drinker     Caffeine:  He admits to consuming caffeine via coffee ( 2 servings per day ).          Substance Abuse History:     Last Reviewed on 11/12/2019 06:33 PM by Tommy Garduno    None         Mental Health History:     Last Reviewed on 11/12/2019 06:33 PM by Tommy Garduno        Communicable Diseases (eg STDs):     Last Reviewed on 11/12/2019 06:33 PM by Tommy Garduno        Current Problems:     Last Reviewed on 11/12/2019 06:33 PM by Tommy Garduno    Type 2 diabetes mellitus with diabetic neuropathy, unspecified    Essential (primary) hypertension    Other atherosclerosis of native arteries of extremities, unspecified extremity    Cardiac murmur, unspecified    Other iron deficiency anemias    Constipation, unspecified    Type 2 diabetes mellitus with unspecified complications    Hereditary and idiopathic neuropathy, unspecified    Other long term (current) drug therapy    Cerebral ischemia    Chronic diastolic (congestive) heart failure    Chronic kidney disease, stage 4 (severe)    Anemia in chronic kidney disease    Unqualified visual loss, right eye, normal vision left eye    Encounter for follow-up examination after completed treatment for conditions other than malignant neoplasm    Personal history of other malignant neoplasm of large intestine        Immunizations:     None        Allergies:     Last Reviewed on 11/12/2019 06:33 PM by Tommy Garduno    No Known Allergies.        Current Medications:     Last Reviewed on 11/12/2019 06:33 PM by Tommy Garduno    amoxicillin 500 mg oral capsule [take 1 capsule (500 mg) by oral route 2 times per day]    aspirin 325 mg oral tablet [take 1 tablet (325 mg) by oral route every day]    Amlodipine  10 mg oral tablet [1 tab daily]    Glipizide 10mg Tablets [1 tab bid]    Accu-Chek Anna Plus Test Strips  Reagent Strips [check blood sugar bid  DX  "E11.9]    BD Ultra Fine II Lancet  Lancet [check BS bid  DX: E11.9]    BD Ultra-Fine Mini Pen Needle 31G x 3/16\"  Pen Needle [Use BID as directed]    Docusate Sodium 100 mg oral capsule [Take 1 capsule bid for constipation PRN]    Senna 8.6mg Tablet [Take 1 tablet(s) by mouth bid]    Lantus SoloSTAR 100units/1ml Injection [40 units sub-q bid]    carvedilol 6.25 mg oral tablet [TAKE 1 TABLET BY MOUTH TWICE DAILY]    NovoLog FlexPen 100units/1ml Injection [5 units before each meal]    atorvastatin 20 mg oral tablet [1 po q hs ]    Isosorbide Mononitrate 30mg Tablets, Extended Release [Take 1 tablet(s) by mouth qam]    Januvia 50mg Tablet [1 tab daily]    terbinafine HCl 250 mg oral tablet [take 1 tablet (250 mg) by oral route once daily]        Objective:        Vitals:         Current: 12/17/2019 9:59:15 AM    Ht:  5 ft, 6 in;  Wt: 207.2 lbs;  BMI: 33.4T: 98.5 F (oral);  BP: 124/58 mm Hg (left arm, sitting);  P: 74 bpm (left arm (BP Cuff), sitting);  sCr: 3.26 mg/dL;  GFR: 17.14        Exams:     PHYSICAL EXAM:     GENERAL: Vitals recorded well developed, well nourished;  well groomed;  no apparent distress;     E/N/T:  normal EACs, TMs, nasal/oral mucosa, teeth, gingiva, and oropharynx;     NECK: range of motion is normal; trachea is midline;     RESPIRATORY: normal respiratory rate and pattern with no distress; normal breath sounds with no rales, rhonchi, wheezes or rubs;     CARDIOVASCULAR: normal rate; rhythm is regular;  a systolic murmur is noted: it is grade 2/6;  trace pedal edema;     GASTROINTESTINAL: nontender; normal bowel sounds;     SKIN: onychomycosis of toe(s);     MUSCULOSKELETAL: gait: slowed and uses a cane;  normal overall tone     NEUROLOGIC: mental status: alert and oriented x 3; GROSSLY INTACT     PSYCHIATRIC: appropriate affect and demeanor; normal speech pattern;         Assessment:         I10   Essential (primary) hypertension       E11.40   Type 2 diabetes mellitus with diabetic " neuropathy, unspecified       A41.51   Sepsis due to Escherichia coli [E. coli]           ORDERS:         Meds Prescribed:       [Refilled] furosemide 20 mg oral tablet [take 1 tablet (20 mg) by oral route once per day], #90 (ninety) tablets, Refills: 1 (one)       [Refilled] Lantus Solostar U-100 Insulin 100 unit/mL (3 mL) subcutaneous Insulin Pen [40 units sub-q qAM and 20 units qHS], #15 (fifteen) milliliters, Refills: 2 (two)         Lab Orders:       [id number]  [facility] CBC with 3 part diff  (Send-Out)            [id number]  [facility] Comp. Metabolic Panel  (Send-Out)            APPTO  Appointment need  (In-House)                      Plan:         Essential (primary) hypertensionBP seems to be well controlled, our regimen now will be Imdur 30 mg qd, lisinopril 40 mg qd, coreg 6.25, amlodipine 10 mg qd, and lasix 20 mg qd. D/c hydralazine 100 mg TID, clonidine 0.2 mg BID, and HCTZ 25 mg qd. 8    LABORATORY:  Labs ordered to be performed today include CBC and Comprehensive metabolic panel.      FOLLOW-UP: Schedule a follow-up visit in 1 month.:.            Prescriptions:       [Refilled] furosemide 20 mg oral tablet [take 1 tablet (20 mg) by oral route once per day], #90 (ninety) tablets, Refills: 1 (one)           Orders:       [id number]  [facility] CBC with 3 part diff  (Send-Out)            [id number]  [facility] Comp. Metabolic Panel  (Send-Out)            APPTO  Appointment need  (In-House)              Type 2 diabetes mellitus with diabetic neuropathy, unspecifiedContinue Lantus 40 units qAM and 20 units qHS. Cont all other meds unchanged as before novolog 5 units TID, glipizide 10 mg BID, and januvia 50 mg qd.          Prescriptions:       [Refilled] Lantus Solostar U-100 Insulin 100 unit/mL (3 mL) subcutaneous Insulin Pen [40 units sub-q qAM and 20 units qHS], #15 (fifteen) milliliters, Refills: 2 (two)         Sepsis due to Escherichia coli [E. coli]Complete amoxicillin BID.            Patient Recommendations:         For  Essential (primary) hypertension:    Schedule a follow-up visit in 1 month.                APPOINTMENT INFORMATION:        Monday Tuesday Wednesday Thursday Friday Saturday Sunday            Time:___________________AM  PM   Date:_____________________             Charge Capture:         Primary Diagnosis:     I10  Essential (primary) hypertension           Orders:      [id number]  Transitional care manage service 7 day discharge  (In-House)            APPTO  Appointment need  (In-House)              E11.40  Type 2 diabetes mellitus with diabetic neuropathy, unspecified     A41.51  Sepsis due to Escherichia coli [E. coli]

## 2021-05-18 NOTE — PROGRESS NOTES
"Jr. Delgado Thomas 1935     Office/Outpatient Visit    Visit Date: Fri, Mar 2, 2018 08:44 am    Provider: Ezekiel Nava MD (Assistant: Fern Rosas MA)    Location: [facility]        Electronically signed by Ezekiel Nava MD on  03/02/2018 12:19:55 PM                             SUBJECTIVE:        CC:     Mr. Danny Jr. is a 82 year old Black or  male.  This is a follow-up visit.  6 WEEK FOLLOW UP; NEEDS METOPROLOL AND METFORMIN REFILLED         HPI: ?LANT DOSE    ?RESTART METFORMIN     LAST NEPHRO APPT     BLOOD    L KNEE         Patient to be evaluated for iDDM.  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  Compliance with treatment has been good.  Current meds include an oral hypoglycemic and insulin.  He does not perform home blood glucose monitoring.  Most recent lab results include.  Hemoglobin A1c:  8.9 (%) (02/01/2018), Foot Exam (Annual):  05/23/2017 (05/22/2017), Microalbumin, Urine, rand:  43.2 (mg/L) (09/15/2017),  74.2 (mg/L) (02/01/2018) In regard to preventative care, his last ophthalmology exam was in 2017.  HAD BEEN OFF METFOMMIN DUE TO RENAL ISSUES BUT NOW STATES HE IS TAKING IT AGAIN.      ROS:     CONSTITUTIONAL:  Negative for chills and fever.      E/N/T:  Negative for ear pain, nasal congestion and sore throat.      CARDIOVASCULAR:  Positive for pedal edema ( mild ).   Negative for chest pain or palpitations.      RESPIRATORY:  Negative for recent cough and dyspnea.      GASTROINTESTINAL:  Positive for SEVERAL EPISODES OF BRITHT RED BLOOD WITH HIS BM'S RECENTLY.  HISTORY OF PRIOR COLN CANCER.  LAST SCOPE 2016..   Negative for abdominal pain, nausea or vomiting.      MUSCULOSKELETAL:  Positive for arthralgias and (LEFT KNEE GETTING WORSE) LEGS \"BOWED\" SINCE BIRTH.      NEUROLOGICAL:  Positive for paresthesia ( LOWER LEGS FEEL \"TIRED\" ).   Negative for dizziness, headaches or weakness.          PMH/FMH/SH:     Last Reviewed on 3/02/2018 09:27 AM " by Ezekiel Nava    Past Medical History:             PAST MEDICAL HISTORY         Colon cancer         CURRENT MEDICAL PROVIDERS:    Nephrologist         Surgical History:         Positive for    Cataract Removal: bilateral; ;     Positive for    Colonoscopy ( 2016;; );         Family History:     Family Medical History Unremarkable         Social History:     Occupation:    Retired         Tobacco/Alcohol/Supplements:     Last Reviewed on 3/02/2018 09:27 AM by Ezekiel Nava    Tobacco: He has a past history of cigarette smoking; quit date:  1975.  Non-drinker     Caffeine:  He admits to consuming caffeine via coffee ( 2 servings per day ).          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         Current Medications:     Last Reviewed on 3/02/2018 09:28 AM by Ezekiel Nava    Glipizide 10mg Tablets 1 tab bid     BD Ultra-Fine 6mm Needle 31G Insulin Syringe 1ml Syringe use with lantus daily  DX E11.9     Amlodipine  10mg Tablet 1 tab daily     Clonidine HCl 0.2mg Tablets 1/2 to 1 tab po BID     Hydrochlorothiazide (HCTZ) 25mg Tablet 1 tab daily     Lisinopril 40mg Tablet 1 tab daily     Metoprolol Tartrate 25mg Tablet Take 1 tablet(s) by mouth twice daily     Lantus 100units/1ml Injection 50 units BID     Metformin HCl 500mg Tablet 1 tab bid         OBJECTIVE:        Vitals:         Current: 3/2/2018 8:48:23 AM     Ht:  5 ft, 6 in;  Wt: 199 lbs;  BMI: 32.1    T: 96.8 F (tympanic);  BP: 121/69 mm Hg (left arm, sitting);  P: 50 bpm (left arm (BP Cuff), sitting);  sCr: 1.96 mg/dL;  GFR: 28.96        Exams:     PHYSICAL EXAM:     GENERAL: Vitals recorded well developed, well nourished;  well groomed;  no apparent distress;     NECK: trachea is midline; thyroid is non-palpable;     RESPIRATORY: normal respiratory rate and pattern with no distress; normal breath sounds with no rales, rhonchi, wheezes or rubs;     CARDIOVASCULAR: normal rate; rhythm is regular;  a systolic murmur is noted: it is grade 2/6 and NEG ECHO 2017;  trace pedal edema;     GASTROINTESTINAL: nontender; rectal exam: normal tone; no masses; no hemorrhoids;     GENITOURINARY: prostate:  no nodules, tenderness, or enlargement;     LYMPHATIC: no enlargement of cervical or facial nodes;     MUSCULOSKELETAL: BILATERAL VALGUS DEFORMITY OF TIB/FIB.;     NEUROLOGIC: GROSSLY INTACT         ASSESSMENT           250.01   E11.9  IDDM              DDx:     569.3   K62.5  Rectal bleeding              DDx:     719.46   M17.12  Left Knee pain              DDx:         ORDERS:         Meds Prescribed:       Refill of: Metoprolol Tartrate 25mg Tablet Take 1 tablet(s) by mouth twice daily  #60 (Sixty) tablet(s) Refills: 5         Lab Orders:       [id number]  [address] LIPID,CMP, A1C: 02527, 30390, 04861  (Send-Out)         [id number]  [facility] CBC with 3 part diff  (Send-Out)           Procedures Ordered:       REFER  Referral to Specialist or Other Facility  (Send-Out)         REFER  Referral to Specialist or Other Facility  (Send-Out)                   PLAN:          IDDM     LABORATORY:  Labs ordered to be performed today include CBC and Diabetes Panel 1; CMP, Lipid, A1C.      FOLLOW-UP: Schedule a follow-up visit in 3 months.      WE WILL CHECK WITH HIS NEPHROLOGIST ABOUT THE METFORMIN           Orders:       [id number]  [facility] [place] [address] LIPID,CMP, A1C: 91820, 18780, 39173  (Send-Out)          [id number]  [facility] with 3 part diff  (Send-Out)            Rectal bleeding         REFERRALS:  Referral initiated to a general surgeon ( Dr. Michael Mariscal; HAS SEEN BEFORE ).            Orders:       REFER  Referral to Specialist or Other Facility  (Send-Out)           Left Knee pain         REFERRALS:  Referral initiated to an orthopedist ( Dr. Mike Oliva; Dr. Clark Humphrey; HIS PREFERENCE ).            Orders:       REFER  Referral to Specialist or Other Facility  (Send-Out)               Other Prescriptions:       Refill of: Metoprolol Tartrate 25mg Tablet Take 1 tablet(s) by mouth twice daily  #60 (Sixty) tablet(s) Refills: 5         Patient Recommendations:        For  IDDM:     Schedule a follow-up visit in 3 months.              CHARGE CAPTURE           **Please note: ICD descriptions below are intended for billing purposes only and may not represent clinical diagnoses**        Primary Diagnosis:         250.01 IDDM            E11.9    Type 2 diabetes mellitus without complications              Orders:          [id number]   Office/outpatient visit; established patient, level 4  (In-House)           569.3 Rectal bleeding            K62.5    Hemorrhage of anus and rectum    719.46 Left Knee pain            M17.12    Unilateral primary osteoarthritis, left knee        ADDENDUMS:      ____________________________________    Date: 03/07/2018 12:41 PM    Author: Kylah Gunn         Visit Note Faxed to:        Clark Humphrey  (Orthopedics); Number [phone]     Health Summary Faxed to:        Clark Humphrey  (Orthopedics); Number [phone]            Date: 03/07/2018 12:42 PM    Author: Kylah Gunn         Visit Note Faxed to:        Michael Mariscal  (General Surgery); Number [phone]

## 2021-05-18 NOTE — PROGRESS NOTES
Carlos Delgado 1935     Office/Outpatient Visit    Visit Date: Mon, Sep 23, 2019 01:54 pm    Provider: Tommy Garduno MD (Assistant: Alondra Magallanes MA)    Location: [facility]        Electronically signed by Tommy Garduno MD on  09/23/2019 08:42:26 PM                             SUBJECTIVE:        CC:     Mr. Delgado is a 84 year old Black or  male.  This is a follow-up visit.  check up         HPI:     A1c has been somewhat eradic this year from 10.4 -> 6.6 -> 9.4 on 8/26/19. He is on lantus 40 units BID, novolog 5 units BID, glipizide 10 mg BID, and januvia 25 mg qd. He checks glucose not as often as he should, typically upper 200s to 300s. He lives at [facility], who switched him to sugar free desserts but have now switched him back to regular dessert.     BP today is 142/44 with a HR of 71. Imdur 30 mg qd was started 1 month ago and he is has continued lisinopril 40 mg qd, coreg 6.25, HCTZ 25 mg qd, amlodipine 10 mg qd, hydralazine 100 mg TID, clonidine 0.2 mg BID.     BLE edema has improved. He is on lisinopril 40 mg qd, coreg 6.25 mg BID, and lasix 40 mg qAM and 1/2 tab qHS. ECHO done 1/30/19 shows diastolic heart failure. Pt overall feels well but did have some shortness of breath with walking a short distance recently. He walks with a walker. Pt has decreased appetite and 1 episode of fatigue yesterday. Pt had bowel incontinence about daily for the last week.     Cr this year has been 1.9 -> 2.38 -> 2.2 -> 2.4 -> 2.73 -> 2.26 -> 2.42. Pt saw Dr. Frausto on 7/1 and then nurse practitioner on 7/15. Pt has CKD IV w/ stable Cr of 2.26. Anemia H/H 9.2/29.1. Renal US and PVR ordered.     Hgb this year has been 10.5 -> 9.8 -> 9.2 -> 9.8. Colonoscopy 6/17/19 showed 1 polyp and minimal internal hemorrhoids.     Pt had a few episodes of bowel incontinence over the last week. He also has constipation and may not have a BM for a few days. He is prescribed senna but  this is a PRN medication and therefore not in his daily medications package.     ROS:     CONSTITUTIONAL:  Negative for fatigue and fever.      EYES:  Negative for blurred vision.      E/N/T:  Negative for diminished hearing and nasal congestion.      CARDIOVASCULAR:  Negative for chest pain and palpitations.      RESPIRATORY:  Positive for dyspnea ( with mild exertion ).   Negative for recent cough.      GASTROINTESTINAL:  Positive for constipation, diarrhea and change in stool caliber.   Negative for abdominal pain, hematochezia ( resolved ), nausea or vomiting.      MUSCULOSKELETAL:  Positive for limb pain ( left leg pain; left foot ).   Negative for arthralgias or myalgias.      NEUROLOGICAL:  Positive for paresthesias.   Negative for weakness.      PSYCHIATRIC:  Negative for anxiety, depression and sleep disturbance.          PMH/FMH/SH:     Last Reviewed on 8/26/2019 02:15 PM by Tommy Garduno    Past Medical History:             PAST MEDICAL HISTORY         Colon cancer         CURRENT MEDICAL PROVIDERS:    Nephrologist         PREVENTIVE HEALTH MAINTENANCE             COLORECTAL CANCER SCREENING:; colonoscopy with the following abnormalities noted-- 1 tubular adenoma and Polyp(s); 6/17/19 - Dr. Mariscal         Surgical History:         Positive for    Cataract Removal: bilateral; ;     Positive for    Colonoscopy ( 2016;; );         Family History:     Family Medical History Unremarkable         Social History:     Occupation:    Retired         Tobacco/Alcohol/Supplements:     Last Reviewed on 8/26/2019 02:15 PM by Tommy Garduno    Tobacco: He has a past history of cigarette smoking; quit date:  1975.  Non-drinker     Caffeine:  He admits to consuming caffeine via coffee ( 2 servings per day ).          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         Current Medications:     Last Reviewed on 8/26/2019 02:15 PM by Tommy Garduno    Amlodipine  10mg Tablet 1 tab daily     Hydralazine HCl 100mg Tablet Take 1 tablet(s) by mouth tid     Januvia 25mg Tablet Take 1 tablet(s) by mouth daily     Lisinopril 40mg Tablet Take 1 tablet(s) by mouth bid     Isosorbide Mononitrate 30mg Tablets, Extended Release Take 1 tablet(s) by mouth qam     Glipizide 10mg Tablets 1 tab bid     Catapres 0.2mg Tablets Take 1 tablet(s) by mouth bid     Docusate Sodium 100mg Capsules Take 1 capsule bid for constipation PRN     Senna 8.6mg Tablet Take 2 tablets po BID, prn for constipation HOLD FOR DIARRHEA     Accu-Chek Anna Plus Test Strips  Reagent Strips check blood sugar bid  DX E11.9     BD Ultra Fine II Lancet  Lancet check BS bid  DX: E11.9     BD Ultra-Fine Mini Pen Needle 31G x 3/16\"  Pen Needle Use BID as directed     Carvedilol 6.25mg Tablet 1 tab bid     Hydrochlorothiazide (HCTZ) 12.5mg Tablet 1 po daily         OBJECTIVE:        Vitals:         Current: 9/23/2019 2:05:08 PM    Ht:  5 ft, 6 in;  Wt: 206 lbs;  BMI: 33.2    T: 97.5 F (oral);  BP: 142/44 mm Hg (right arm, sitting);  P: " 71 bpm (right arm (BP Cuff), sitting);  sCr: 2.42 mg/dL;  GFR: 23.03        Exams:     PHYSICAL EXAM:     GENERAL: Vitals recorded well developed, well nourished;  well groomed;  no apparent distress;     E/N/T:  normal EACs, TMs, nasal/oral mucosa, teeth, gingiva, and oropharynx;     RESPIRATORY: normal respiratory rate and pattern with no distress; normal breath sounds with no rales, rhonchi, wheezes or rubs;     CARDIOVASCULAR: normal rate; rhythm is regular;  a systolic murmur is noted: it is grade 2/6;  trace pedal edema;     GASTROINTESTINAL: nontender; normal bowel sounds;     MUSCULOSKELETAL: gait: slowed;  normal overall tone     NEUROLOGIC: mental status: alert and oriented x 3; cranial nerves II-XII grossly intact; GROSSLY INTACT     PSYCHIATRIC: appropriate affect and demeanor; normal speech pattern;         ASSESSMENT           250.00   E11.8  Type 2 DM              DDx:     401.1   I10  HTN              DDx:     428.32   I50.32  Diastolic heart failure, chronic              DDx:     585.4   N18.4  Chronic kidney disease, Stage IV (severe)              DDx:     285.21   D63.1  Anemia in chronic kidney disease              DDx:     787.99   R19.4  Change in bowel habits              DDx:         ORDERS:         Meds Prescribed:       Refill of: Januvia (Sitagliptin Phosphate) 50mg Tablet 1 tab daily  #90 (Ninety) tablet(s) Refills: 1       Refill of: Senna 8.6mg Tablet Take 1 tablet(s) by mouth bid  #60 (Sixty) tablet(s) Refills: 2         Lab Orders:       [id number]  NTBNP - [facility] B-Type Natriurectic peptide  (Send-Out)         12400  [facility] - [facility] CBC with 3 part diff  (Send-Out)         [id number]  COMP [facility] Comp. Metabolic Panel  (Send-Out)         APPTO  Appointment need  (In-House)                   PLAN:          Type 2 DM A1c was much worse 1 month ago. We are increasing lantus from 40 to 45 units BID and increasing januvia from 25 to 50 mg qd. Cont novolog 5 units BID and glipizide 10 mg qd.          FOLLOW-UP: Schedule a follow-up visit in 2 months.:.            Prescriptions:       Refill of: Januvia (Sitagliptin Phosphate) 50mg Tablet 1 tab daily  #90 (Ninety) tablet(s) Refills: 1           Orders:       APPTO  Appointment need  (In-House)            HTN BP much better at 142/44 with addition of imdur. Since BP is somewhat labile we will continue medication as is with Imdur 30 mg qd, lisinopril 40 mg qd, coreg 6.25, HCTZ 25 mg qd, amlodipine 10 mg qd, hydralazine 100 mg TID, clonidine 0.2 mg BID.          Diastolic heart failure, chronic Cont lisinopril 40 mg qd, coreg 6.25 mg BID, and lasix 40 mg qAM and 1/2 tab qHS     LABORATORY:  Labs ordered to be performed today include BNP, CBC, and Comprehensive metabolic panel.            Orders:       [id number]  NTBNP - [facility] B-Type Natriurectic peptide  (Send-Out)         23788  BDCBC - [facility] CBC with 3 part diff  (Send-Out)         [id number]  COMP - [facility] Comp. Metabolic Panel  (Send-Out)            Chronic kidney disease, Stage IV (severe) Stable, f/u with Dr. Frausto as needed.          Anemia in chronic kidney disease Stable.          Change in bowel habits Combination of diarrhea, constipation, and encorporesis is suspicious for stool impaction. Senna is prescribed BID as bowel motility is likely a factor in elderly with long-standing DM 2. This may help decreased appetite as well.           Prescriptions:       Refill of: Senna 8.6mg Tablet Take 1 tablet(s) by mouth bid  #60 (Sixty) tablet(s) Refills: 2             Patient Recommendations:        For  Type 2 DM:     Schedule a follow-up visit in 2 months.                APPOINTMENT INFORMATION:        Monday Tuesday Wednesday Thursday Friday Saturday Sunday            Time:___________________AM  PM   Date:_____________________             CHARGE CAPTURE           **Please note: ICD descriptions below are intended for billing purposes only and may not represent clinical diagnoses**        Primary Diagnosis:          250.00 Type 2 DM            E11.8    Type 2 diabetes mellitus with unspecified complications              Orders:          [id number]   Office/outpatient visit; established patient, level 4  (In-House)             APPTO   Appointment need  (In-House)           401.1 HTN            I10    Essential (primary) hypertension    428.32 Diastolic heart failure, chronic            I50.32    Chronic diastolic (congestive) heart failure    585.4 Chronic kidney disease, Stage IV (severe)            N18.4    Chronic kidney disease, stage 4 (severe)    285.21 Anemia in chronic kidney disease            D63.1    Anemia in chronic kidney disease    787.99 Change in bowel habits            R19.4    Change in bowel habit        ADDENDUMS:      ____________________________________    Addendum: 09/24/2019 12:21 AM - Adilson Souza         Visit Note Faxed to:        [facility] (Med[facility] Number [phone]

## 2021-05-18 NOTE — PROGRESS NOTES
Jr. Delgado Thomas 1935     Office/Outpatient Visit    Visit Date: Thu, Jan 17, 2019 09:16 am    Provider: Tommy Garduno MD (Assistant: Fern Rosas MA)    Location: [facility]        Electronically signed by Tommy Garduno MD on  01/19/2019 08:52:26 AM                             SUBJECTIVE:        CC:     Mr. Danny Jr. is a 83 year old Black or  male.  presents today due to possible infection in toe This is a follow-up visit.  diabetes check up, pt states he lives in a nursing home and doesnt know what meds he is taking         HPI:     Pt has DM 2, he is on lantus 50 units BID and glipizide 10 mg BID. He has CKD and is thefore not on metformin. Checks blood sugar twice a day most days, but some times skips an entire day. Last couple of weeks its been very high, was 591 maybe a week ago. Son reports its often 200-300s in the morning and then improves to 100s. He drinks an occassional soda and sneaks candy, his [employer]UM[employer] serves dessert. He also consumes a lot of carbs through longterm diet.     CKD stage III, Cr has been increasing over the last year.     Pt has clinical criteria for CHF, BLE pitting edema, lung crackles and worsening, long standing DM 2.     Pt reports increased flatulence recently and this causes discomfort and embarrassment.     ROS:     CONSTITUTIONAL:  Negative for fatigue and fever.      EYES:  Negative for blurred vision.      E/N/T:  Negative for diminished hearing and nasal congestion.      CARDIOVASCULAR:  Negative for chest pain and palpitations.      RESPIRATORY:  Negative for recent cough and dyspnea.      GASTROINTESTINAL:  Negative for abdominal pain, constipation, diarrhea, nausea and vomiting.      GENITOURINARY:  Negative for dysuria.      MUSCULOSKELETAL:  Positive for limb pain ( left leg pain ).   Negative for arthralgias or myalgias.      INTEGUMENTARY:  Positive for toe nail changes.      NEUROLOGICAL:  Negative for weakness.      PSYCHIATRIC:   Negative for anxiety, depression and sleep disturbance.          PMH/FMH/SH:     Last Reviewed on 9/28/2018 09:55 AM by Ezekiel Nava    Past Medical History:             PAST MEDICAL HISTORY         Colon cancer         CURRENT MEDICAL PROVIDERS:    Nephrologist         Surgical History:         Positive for    Cataract Removal: bilateral; ;     Positive for    Colonoscopy ( 2016;; );         Family History:     Family Medical History Unremarkable         Social History:     Occupation:    Retired         Tobacco/Alcohol/Supplements:     Last Reviewed on 9/28/2018 09:54 AM by Ezekiel Nava    Tobacco: He has a past history of cigarette smoking; quit date:  1975.  Non-drinker     Caffeine:  He admits to consuming caffeine via coffee ( 2 servings per day ).          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         Current Medications:     Last Reviewed on 9/28/2018 10:08 AM by Ezekiel Nava    Clonidine HCl 0.2mg Tablets 1/2 to 1 tab po BID     Metoprolol Succinate 25mg Tablets, Extended Release 1 tablet BID     Amlodipine  10mg Tablet 1 tab daily     Hydrochlorothiazide (HCTZ) 25mg Tablet 1 tab daily     Lisinopril 40mg Tablet 1 tab daily     Accu-Chek Anna Plus Test Strips  Reagent Strips check blood sugar bid  DX E11.9     BD Ultra Fine II Lancet  Lancet check BS bid  DX: E11.9     BD  "Ultra-Fine Mini Pen Needle 31G x 3/16\"  Pen Needle Use BID as directed     Docusate Sodium 100mg Capsules Take 1 capsule bid for constipation PRN     Glipizide 10mg Tablets 1 tab bid     Lantus SoloSTAR 100units/1ml Injection 50 units BID     Senna 8.6mg Tablet Take 2 tablets po BID, prn for constipation HOLD FOR DIARRHEA         OBJECTIVE:        Vitals:         Current: 1/17/2019 9:24:48 AM    Ht:  5 ft, 6 in;  Wt: 224.4 lbs;  BMI: 36.2    T: 98.3 F (tympanic);  BP: 146/49 mm Hg (left arm, sitting);  P: 55 bpm (left arm (BP Cuff), sitting);  sCr: 2.2 mg/dL;  GFR: 26.71        Exams:     PHYSICAL EXAM:     GENERAL: Vitals recorded well developed, well nourished;  well groomed;  no apparent distress;     NECK: trachea is midline; thyroid is non-palpable;     RESPIRATORY: normal respiratory rate and pattern with no distress; normal breath sounds with no rales, rhonchi, wheezes or rubs;     CARDIOVASCULAR: normal rate; rhythm is regular;  a systolic murmur is noted: it is grade 2/6 and NEG ECHO 2017;  3+ pedal and 3+ pitting edema to just below the knees bilaterally edema;     GASTROINTESTINAL: nontender;     LYMPHATIC: no enlargement of cervical or facial nodes;     MUSCULOSKELETAL: BILATERAL VALGUS DEFORMITY OF TIB/FIB.;     NEUROLOGIC: GROSSLY INTACT         ASSESSMENT           250.00   E11.8  Type 2 DM              DDx:     585.3   N18.3  Chronic kidney disease, Stage III (moderate)              DDx:     428.0   I50.9  CHF              DDx:     787.3   R14.3  Flatulence              DDx:         ORDERS:         Meds Prescribed:       Refill of: Glipizide 10mg Tablets 1 tab bid  #56 (Fifty Six) tablet(s) Refills: 2       Refill of: Lantus SoloSTAR (Insulin Glargine (rDNA)) 100units/1ml Injection 50 units BID QS for 30 day(s) Refills: 2       Insulin Aspart (rDNA) 100units/1ml Injection 5 units before meals  #10 (Ten) 3 ml prefilled pen Refills: 0       Simethicone 125mg Chewable Tablet BID PRN  #60 (Sixty) " tablet(s) Refills: 0       Lasix (Furosemide) 20mg Tablet 1 tab daily  #30 (Thirty) tablet(s) Refills: 0         Radiology/Test Orders:       [id number]  Echocardiography, transthoracic, real-time w image (2D), w M-mode, w spectral & color flow Doppler  (Send-Out)           Lab Orders:       [id number]  NTBNP - [facility] B-Type Natriurectic peptide  (Send-Out)         [id number]  [facility] CBC with 3 part diff  (Send-Out)         [id number]  DIAB2 - [facility] CMP A1C LIPID AND MICRO ALBUM CR RATIO: [id number]  (Send-Out)           Procedures Ordered:       REFER  Referral to Specialist or Other Facility  (Send-Out)         REFER  Referral to Specialist or Other Facility  (Send-Out)                   PLAN:          Type 2 DM Pt is feeling well and in upbeat mood but overall health appears rather poor with poorly controlled DM 2, worsening CKD, and clinical criteria for CHF. Pt counseled to keep better track of blood sugar (BID) and avoid desserts and sweets. Son was counseled to talk with [facility] about a diabetic diet. Labs today. Will add short acting insulin TID w/ meals, which can be increased as needed. f/u with PCP in 1 month.     LABORATORY:  Labs ordered to be performed today include CBC and Diabetes Panel 2;CMP, A1C, Lipid, Microalbumin:Creatinine Ratio.            Prescriptions:       Refill of: Glipizide 10mg Tablets 1 tab bid  #56 (Fifty Six) tablet(s) Refills: 2       Refill of: Lantus SoloSTAR (Insulin Glargine (rDNA)) 100units/1ml Injection 50 units BID QS for 30 day(s) Refills: 2       Insulin Aspart (rDNA) 100units/1ml Injection 5 units before meals  #10 (Ten) 3 ml prefilled pen Refills: 0           Orders:       [id number]  [facility] CBC with 3 part diff  (Send-Out)         [id number]  DIAB2 - [facility] CMP A1C LIPID AND MICRO ALBUM CR RATIO: [id number]  (Send-Out)            Chronic kidney disease, Stage III (moderate) Given worsening Cr it would benefit to establish with nephrology for further evaluation.          REFERRALS:  Referral initiated to a nephrologist.            Orders:       REFER  Referral to Specialist or Other Facility  (Send-Out)            CHF Pt meets clinical criteria for CHF, ECHO, BNP, and cardiology referral ordered.     LABORATORY:  Labs ordered to be performed today include BNP.      REFERRALS:  Referral initiated to a cardiologist ( Dr. Milton Zuniga, [facility]; ECHO ).            Prescriptions:       Lasix (Furosemide) 20mg Tablet 1 tab daily  #30 (Thirty) tablet(s) Refills: 0           Orders:       [id number]  NTBNP - [place] B-Type Natriurectic peptide  (Send-Out)         REFER  Referral to Specialist or Other Facility  (Send-Out)         03708  Echocardiography, transthoracic, real-time w image (2D), w M-mode, w spectral & color flow Doppler  (Send-Out)            Flatulence           Prescriptions:       Simethicone 125mg Chewable Tablet BID PRN  #60 (Sixty) tablet(s) Refills: 0             CHARGE CAPTURE           **Please note: ICD descriptions below are intended for billing purposes only and may not represent clinical diagnoses**        Primary Diagnosis:         250.00 Type 2 DM            E11.8    Type 2 diabetes mellitus with unspecified complications              Orders:          [id number]   Office/outpatient visit; established patient, level 4  (In-House)           585.3 Chronic kidney disease, Stage III (moderate)            N18.3    Chronic kidney disease, stage 3 (moderate)    428.0 CHF            I50.9    Heart failure, unspecified    787.3 Flatulence            R14.3    Flatulence

## 2021-05-18 NOTE — PROGRESS NOTES
Carlos Delgado 1935     Office/Outpatient Visit    Visit Date: Mon, Aug 26, 2019 01:46 pm    Provider: Tommy Garduno MD (Assistant: Amira Yanes MA)    Location: [facility]        Electronically signed by Tommy Garduno MD on  08/26/2019 06:37:02 PM                             SUBJECTIVE:        CC:     Mr. Delgado is a 84 year old Black or  male.  This is a follow-up visit.  pt doesnt know what he takes, doesnt have med list         HPI:         PHQ-9 Depression Screening: Completed form scanned and in chart; Total Score 0 Alcohol Consumption Screening: Completed form scanned and in chart; Total Score 0     Pt saw Dr. Frausto on 7/1 and then nurse practitioner on 7/15. Pt has CKD IV w/ stable Cr of 2.26. Anemia H/H 9.2/29.1. Renal US and PVR ordered.     ECHO done 1/30/19 shows diastolic heart failure. Pt overall feels well but has severe pitting edema of BLE. He walks with a walker and denies shortness of breath.     A1c was 6.6 on 5/6/19. He is on lantus 40 units BID, novolog 5 units BID, glipizide 10 mg BID, and januvia 25 mg qd. He checks glucose not as often as he should, last night it was 309. He lives at [facility], who switched him to sugar free desserts but have now switched him back to regular dessert.     BP today is 178/64 with a HR of 79. He is on lisinopril 40 mg qd, coreg 6.25, HCTZ 25 mg qd, amlodipine 10 mg qd, hydralazine 100 mg TID, clonidine 0.2 mg BID.     Pt has pain in his left foot. This started Saturday night (36 hrs ago). Pain is across top of foot. He had to take a couple old norco to help him fall asleep. No pain on Sunday or today. He is walking today without difficulty. Saturday nigth pain out of no where while he was laying in bed, felt like a pin.     ROS:     CONSTITUTIONAL:  Negative for fatigue and fever.      EYES:  Negative for blurred vision.      E/N/T:  Negative for diminished hearing and nasal congestion.      CARDIOVASCULAR:   Negative for chest pain and palpitations.      RESPIRATORY:  Negative for recent cough and dyspnea.      GASTROINTESTINAL:  Negative for abdominal pain, constipation, diarrhea, hematochezia ( resolved ), nausea and vomiting.      MUSCULOSKELETAL:  Positive for limb pain ( left leg pain; left foot ).   Negative for arthralgias or myalgias.      NEUROLOGICAL:  Positive for paresthesias.   Negative for weakness.      PSYCHIATRIC:  Negative for anxiety, depression and sleep disturbance.          PMH/FMH/SH:     Last Reviewed on 8/26/2019 02:15 PM by Tommy Garduno    Past Medical History:             PAST MEDICAL HISTORY         Colon cancer         CURRENT MEDICAL PROVIDERS:    Nephrologist         Surgical History:         Positive for    Cataract Removal: bilateral; ;     Positive for    Colonoscopy ( 2016;; );         Family History:     Family Medical History Unremarkable         Social History:     Occupation:    Retired         Tobacco/Alcohol/Supplements:     Last Reviewed on 8/26/2019 02:15 PM by Tommy Garduno    Tobacco: He has a past history of cigarette smoking; quit date:  1975.  Non-drinker     Caffeine:  He admits to consuming caffeine via coffee ( 2 servings per day ).          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         Current Medications:     Last Reviewed on 8/26/2019 02:15 PM by Tommy Garduno    Januvia 25mg Tablet Take 1 tablet(s) by mouth daily     Lisinopril 40mg Tablet Take 1 tablet(s) by mouth bid     Glipizide 10mg Tablets 1 tab bid     Hydralazine HCl 100mg Tablet Take 1 tablet(s) by mouth tid     Catapres 0.2mg Tablets Take 1 tablet(s) by mouth bid     Docusate Sodium 100mg Capsules Take 1 capsule bid for constipation PRN     Senna 8.6mg Tablet Take 2 tablets po BID, prn for constipation HOLD FOR DIARRHEA     Accu-Chek Anna Plus Test Strips  Reagent Strips check blood sugar bid  DX E11.9     Amlodipine  10mg Tablet 1 tab daily     BD Ultra Fine II Lancet  Lancet check BS bid  DX: E11.9     Hydrochlorothiazide (HCTZ) 25mg Tablet 1 tab daily     BD Ultra-Fine Mini Pen Needle 31G x 3/16\"  Pen Needle Use BID as directed     Atorvastatin Calcium 20mg Tablet 1 po q hs     Carvedilol 6.25mg Tablet 1 tab bid         OBJECTIVE:        Vitals:         Current: 8/26/2019 1:54:51 PM    Ht:  5 ft, 6 in;  Wt: 216.6 lbs;  BMI: 35.0    T: 98.1 F (oral);  BP: 178/64 mm Hg (left arm, sitting);  P: 79 bpm (left arm (BP Cuff), sitting);  sCr: 2.26 mg/dL;  GFR: 25.19        Exams:     PHYSICAL EXAM:     GENERAL: Vitals recorded well developed, well nourished;  well groomed;  no apparent distress;     E/N/T:  normal EACs, TMs, nasal/oral mucosa, teeth, gingiva, and oropharynx;     RESPIRATORY: normal respiratory rate and pattern with no distress; normal breath sounds with no rales, rhonchi, wheezes or rubs;     CARDIOVASCULAR: normal rate; rhythm is regular;  a systolic murmur is noted: it is grade 2/6;  trace pedal edema;     GASTROINTESTINAL: nontender; normal bowel sounds;     " MUSCULOSKELETAL: gait: slowed;  normal overall tone     NEUROLOGIC: mental status: alert and oriented x 3; cranial nerves II-XII grossly intact; GROSSLY INTACT     PSYCHIATRIC: appropriate affect and demeanor; normal speech pattern;     Left foot exam    Protective sensation using Monofilament test: NORMAL sensation. Patient detects .07 grams of force which is considered normal.    Vascular status: normal peripheral vascular exam with palpable dorsal pedal and posterior tibal pulses and brisk digital capillary refill    Skin is intact without sores or ulcers    Right foot exam    Protective sensation using Monofilament test: NORMAL sensation. Patient detects .07 grams of force which is considered normal.    Vascular status: normal peripheral vascular exam with palpable dorsal pedal and posterior tibal pulses and brisk digital capillary refill    Skin is intact without sores or ulcers         ASSESSMENT           V79.0   Z13.31  Screening for depression              DDx:     585.4   N18.4  Chronic kidney disease, Stage IV (severe)              DDx:     428.32   I50.32  Diastolic heart failure, chronic              DDx:     250.00   E11.8  Type 2 DM              DDx:     401.1   I10  HTN              DDx:     729.5   M25.572  Foot pain              DDx:         ORDERS:         Meds Prescribed:       Refill of: Hydrochlorothiazide (HCTZ) 12.5mg Tablet 1 po daily  #30 (Thirty) tablet(s) Refills: 2       Isosorbide Mononitrate 30mg Tablets, Extended Release Take 1 tablet(s) by mouth qam  #30 (Thirty) tablet(s) Refills: 1         Radiology/Test Orders:       [id number]  Left radiologic examination, foot; complete, minimum of three views  (Send-Out)           Lab Orders:       [id number]  BDCBC - [facility] CBC with 3 part diff  (Send-Out)         [id number]  COMP - [facility] Comp. Metabolic Panel  (Send-Out)         [id number]  A1CEG - [facility] Hemoglobin A1C  (Send-Out)         [id number]  NTBNP - [facility] B-Type Natriurectic peptide  (Send-Out)         APPTO   Appointment need  (In-House)           Other Orders:       [id number]  Foot examination performed (includes examination through visual inspection, sensory exam with monofi  (In-House)           Depression screen negative  (In-House)           Negative EtOH screen  (In-House)                   PLAN:          Screening for depression     MIPS PHQ-9 Depression Screening: Completed form scanned and in chart; Total Score 0; Negative Depression Screen Negative alcohol screen           Orders:         Depression screen negative  (In-House)           Negative EtOH screen  (In-House)            Chronic kidney disease, Stage IV (severe) CKD IV stable and pt has re-established with nephrology recently.          Diastolic heart failure, chronic Cont lisinopril 40 mg qd, coreg 6.25 mg BID, and lasix 40 mg qAM and 1/2 tab qHS. Will check BNP, this appears stable at this time.     LABORATORY:  Labs ordered to be performed today include BNP.            Orders:       [id number]  NTBNP - [facility] B-Type Natriurectic peptide  (Send-Out)            Type 2 DM CBC, CMP, and A1c ordered to assess DM 2 and CKD. Cont medications as is as pt is doing well, in good spirits, and without aches, pains, or complaints.     LABORATORY:  Labs ordered to be performed today include CBC, Comprehensive metabolic panel, and HgbA1C.      FOLLOW-UP: Schedule a follow-up visit in 1 month..            Orders:       [id number]  BDCBC - [facility] CBC with 3 part diff  (Send-Out)         [id number]  COMP - [facility] Comp. Metabolic Panel  (Send-Out)         42825  A1CEG - [facility] Hemoglobin A1C  (Send-Out)         APPTO  Appointment need  (In-House)            HTN We are starting imdur 30 mg qd as BP remains high despite being on 6 medications. I am decreasing HCTZ from 25 to 12.5 mg qd given his CKD IV. Cont lisinopril 40 mg qd, coreg 6.25 mg BID, amlodipine 10 mg qd, hydralazine 100 mg TID, and clonidine 0.2 mg BID.           Prescriptions:       Refill of: Hydrochlorothiazide  (HCTZ) 12.5mg Tablet 1 po daily  #30 (Thirty) tablet(s) Refills: 2       Isosorbide Mononitrate 30mg Tablets, Extended Release Take 1 tablet(s) by mouth qam  #30 (Thirty) tablet(s) Refills: 1          Foot pain This seems to be a transient issue as pain came and went Saturday night and he is now walking on it without worsening pain. We will get an x-ray since it is troubling patient.         RADIOLOGY:  I have ordered a left foot x-ray to be done today.            Orders:       [id number]  Left radiologic examination, foot; complete, minimum of three views  (Send-Out)               Other Orders:       [id number]  Foot examination performed (includes examination through visual inspection, sensory exam with monofi  (In-House)           Patient Recommendations:        For  Type 2 DM:     Schedule a follow-up visit in 1 month.                APPOINTMENT INFORMATION:        Monday Tuesday Wednesday Thursday Friday Saturday Sunday            Time:___________________AM  PM   Date:_____________________             CHARGE CAPTURE           **Please note: ICD descriptions below are intended for billing purposes only and may not represent clinical diagnoses**        Primary Diagnosis:         V79.0 Screening for depression            Z13.31    Encounter for screening for depression              Orders:          [id number]   Office/outpatient visit; established patient, level 4  (In-House)                Depression screen negative  (In-House)                Negative EtOH screen  (In-House)           585.4 Chronic kidney disease, Stage IV (severe)            N18.4    Chronic kidney disease, stage 4 (severe)    428.32 Diastolic heart failure, chronic            I50.32    Chronic diastolic (congestive) heart failure    250.00 Type 2 DM            E11.8    Type 2 diabetes mellitus with unspecified complications              Orders:          APPTO   Appointment need  (In-House)           401.1 HTN            I10    Essential  (primary) hypertension    729.5 Foot pain            M25.572    Pain in left ankle and joints of left foot        Other Orders:           [id number]   Foot examination performed (includes examination through visual inspection, sensory exam with monofi  (In-House)

## 2021-05-18 NOTE — PROGRESS NOTES
"Jr. Delgado Thomas 1935     Office/Outpatient Visit    Visit Date: Fri, Jan 19, 2018 08:40 am    Provider: Ezekiel Nava MD (Assistant: Yue Benites MA)    Location: [facility]        Electronically signed by Ezekiel Nava MD on  01/19/2018 12:39:58 PM                             SUBJECTIVE:        CC:     Mr. Danny Jr. is a 82 year old Black or  male.  This is a follow-up visit.  DIABETES (PT HAS FORGOTTEN TO TAKE BP MEDS, PT IS NOT TAKING IRON)         HPI:         Patient to be evaluated for hTN.  His current cardiac medication regimen includes a diuretic, a beta-blocker, an ACE inhibitor, a calcium channel blocker, and an alpha blocker.  He has not kept a blood pressure diary, but states that pressures have been well controlled.  Compliance with treatment has been good.  FORGOT MEDS TODAY         Concerning iDDM, 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  Compliance with treatment has been good.  Current meds include an oral hypoglycemic, insulin ( DOSE WAS INCREASED ), and GLLIPIZIDE WAS INCREASED TO BID.  He does not perform home blood glucose monitoring.  Most recent lab results include.  Hemoglobin A1c:  9.2 (%) (08/23/2017), Foot Exam (Annual):  05/23/2017 (05/22/2017), Microalbumin, Urine, rand:  43.2 (mg/L) (09/15/2017) In regard to preventative care, his last ophthalmology exam was in 2017.  OFF METFOMMIN DUE TO RENAL ISSUES.      ROS:     CONSTITUTIONAL:  Negative for chills and fever.      E/N/T:  Negative for ear pain, nasal congestion and sore throat.      CARDIOVASCULAR:  Positive for pedal edema ( mild ).   Negative for chest pain or palpitations.      RESPIRATORY:  Negative for recent cough and dyspnea.      GASTROINTESTINAL:  Negative for abdominal pain, nausea and vomiting.      MUSCULOSKELETAL:  Positive for LEGS \"BOWED\" SINCE BIRTH.   Negative for arthralgias.      NEUROLOGICAL:  Positive for paresthesia ( LOWER LEGS FEEL \"TIRED\" ).   " Negative for dizziness, headaches or weakness.          PMH/FMH/SH:     Last Reviewed on 1/19/2018 09:23 AM by Ezekiel Nava    Past Medical History:             PAST MEDICAL HISTORY         Colon cancer         CURRENT MEDICAL PROVIDERS:    Nephrologist         Surgical History:         Positive for    Cataract Removal: bilateral; ;     Positive for    Colonoscopy ( 2016;; );         Family History:     Family Medical History Unremarkable         Social History:     Occupation:    Retired         Tobacco/Alcohol/Supplements:     Last Reviewed on 1/19/2018 09:22 AM by Ezekiel Nava    Tobacco: He has a past history of cigarette smoking; quit date:  1975.  Non-drinker     Caffeine:  He admits to consuming caffeine via coffee ( 2 servings per day ).          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         Current Medications:     Last Reviewed on 10/20/2017 09:14 AM by Ezekiel Nava    Amlodipine  10mg Tablet 1 tab daily     Clonidine HCl 0.2mg Tablets 1/2 to 1 tab po BID     Glipizide 10mg Tablets 1 tab bid     Hydrochlorothiazide (HCTZ) 25mg Tablet 1 tab daily     Lisinopril 40mg Tablet 1 tab daily     Metoprolol Tartrate 25mg Tablet Take 1 tablet(s) by mouth twice daily     Syringes with Needles  Syringe Use as directed w/ lantus Q HS     Ferrous Sulfate 325mg Tablets Take 1 tablet(s) by mouth twice daily      Lantus 100units/1ml Injection 50 U AT HS         OBJECTIVE:        Vitals:         Current: 1/19/2018 8:42:56 AM    Ht:  5 ft, 6 in;  Wt: 187.2 lbs;  BMI: 30.2    T: 97.5 F (oral);  BP: 183/92 mm Hg (left arm, sitting);  P: 117 bpm (left arm (BP Cuff), sitting);  sCr: 2.43 mg/dL;  GFR: 22.76        Repeat:     9:03:10 AM     BP:   160/78mm Hg (left arm, sitting)         Exams:     PHYSICAL EXAM:     GENERAL: Vitals recorded well developed, well nourished;  well groomed;  no apparent distress;     NECK: trachea is midline; thyroid is non-palpable;     RESPIRATORY: normal respiratory rate and pattern with no distress; normal breath sounds with no rales, rhonchi, wheezes or rubs;     CARDIOVASCULAR: normal rate; rhythm is regular;  a systolic murmur is noted: it is grade 2/6 and NEG ECHO 2017;  trace pedal edema;     LYMPHATIC: no enlargement of cervical or facial nodes;     MUSCULOSKELETAL: BILATERAL VALGUS DEFORMITY OF TIB/FIB.;     NEUROLOGIC: GROSSLY INTACT         ASSESSMENT           250.01   E11.9  IDDM              DDx:     401.1   I10  HTN              DDx:         PLAN:          IDDM         MEDICATIONS: I will increase the dose of his LANTUS TO 50 U BID insulin.      RECOMMENDATIONS: instructed in use of home glucose checks.      FOLLOW-UP: Schedule a follow-up appointment in 6 weeks.           HTN         MEDICATIONS: (no change to current medication regimen)     RECOMMENDATIONS given include: TAKE MEDS AS DIRECTED.              Patient Recommendations:        For  IDDM:     Obtain instruction in the proper use of a home blood glucose monitor.  Schedule a follow-up visit in 6 weeks.              CHARGE CAPTURE           **Please note: ICD descriptions below are intended for billing purposes only and may not represent clinical diagnoses**        Primary Diagnosis:         250.01 IDDM            E11.9    Type 2 diabetes mellitus without complications              Orders:          [id number]   Office/outpatient  visit; established patient, level 4  (In-House)           401.1 HTN            I10    Essential (primary) hypertension

## 2021-05-18 NOTE — PROGRESS NOTES
Carlos Delgado  1935     Office/Outpatient Visit    Visit Date: Mon, Jan 27, 2020 10:23 am    Provider: Tommy Garduno MD (Assistant: Amira Yanes MA)    Location: [facility]        Electronically signed by Tommy Garduno MD on  01/27/2020 05:55:38 PM                             Subjective:        CC: Mr. Delgado is a 84 year old Black or  male.  This is a follow-up visit.  check up PT NOT SURE WHAT HE IS TAKING         HPI: Pt reports he is feeling well. He is having left eye surgery on 1/30.           PHQ-9 Depression Screening: Completed form scanned and in chart; Total Score 0       BP today is 156/64 with a HR of 75. BP meds were altered after his admission to [facility] from 12/4 to 12/11. At our last visit Imdur 30 mg qd, lisinopril 40 mg qd, coreg 6.25, amlodipine 10 mg qd, and lasix 20 mg qd were continued. Hydralazine 100 mg TID, clonidine 0.2 mg BID, and HCTZ 25 mg qd were d/c'd. He does not check between visits.      A1c this year has been 10.4 -> 6.6 -> 9.4 -> 7.6 on 11/12/19. He is on Lantus 40 units qAM; novolog 5 units TID, glipizide 10 mg BID, and januvia 50 mg qd. He is out of test strips for the last 3 days, prior to then he was checking glucose BID and it varies a lot but is always high at night.    ROS:     CONSTITUTIONAL:  Negative for fatigue and fever.      EYES:  Positive for lost vision 1 month ago in left eye.      E/N/T:  Negative for diminished hearing and nasal congestion.      CARDIOVASCULAR:  Negative for chest pain and palpitations.      RESPIRATORY:  Positive for dyspnea ( with moderate exertion ).   Negative for recent cough.      GASTROINTESTINAL:  Positive for anorexia ( decreased appetite ), diarrhea and change in stool caliber.   Negative for abdominal pain, constipation, hematochezia ( resolved ), nausea or vomiting.      MUSCULOSKELETAL:  Negative for arthralgias and myalgias.      INTEGUMENTARY:  Positive for fungal nail infection (toenails).       NEUROLOGICAL:  Positive for paresthesias.   Negative for weakness.      PSYCHIATRIC:  Negative for anxiety, depression and sleep disturbance.          Past Medical History / Family History / Social History:         Last Reviewed on 1/27/2020 05:55 PM by Tommy Garduno    Past Medical History:             PAST MEDICAL HISTORY         Chronic Renal Failure: Stage 4;     Colon cancer         CURRENT MEDICAL PROVIDERS:    Nephrologist: Dr. Frausto         PREVENTIVE HEALTH MAINTENANCE             COLORECTAL CANCER SCREENING:; colonoscopy with the following abnormalities noted-- 1 tubular adenoma and Polyp(s); 6/17/19 - Dr. Mariscal         Surgical History:         Positive for    Cataract Removal: bilateral; ;     Positive for    Colonoscopy ( 2016;; );         Family History:     Family Medical History Unremarkable         Social History:     Occupation:    Retired         Tobacco/Alcohol/Supplements:     Last Reviewed on 1/27/2020 05:55 PM by Tommy Garduno    Tobacco: He has a past history of cigarette smoking; quit date:  1975.  Non-drinker     Caffeine:  He admits to consuming caffeine via coffee ( 2 servings per day ).          Substance Abuse History:     Last Reviewed on 1/27/2020 05:55 PM by Tommy Garduno    None         Mental Health History:     Last Reviewed on 1/27/2020 05:55 PM by Tommy Garduno        Communicable Diseases (eg STDs):     Last Reviewed on 1/27/2020 05:55 PM by Tommy Garduno        Current Problems:     Last Reviewed on 1/27/2020 05:55 PM by Tommy Garduno    Type 2 diabetes mellitus with diabetic neuropathy, unspecified    Essential (primary) hypertension    Other atherosclerosis of native arteries of extremities, unspecified extremity    Cardiac murmur, unspecified    Other iron deficiency anemias    Constipation, unspecified    Type 2 diabetes mellitus with unspecified complications    Hereditary and idiopathic neuropathy, unspecified    Other long term (current) drug  "therapy    Cerebral ischemia    Chronic diastolic (congestive) heart failure    Chronic kidney disease, stage 4 (severe)    Anemia in chronic kidney disease    Unqualified visual loss, right eye, normal vision left eye    Encounter for follow-up examination after completed treatment for conditions other than malignant neoplasm    Personal history of other malignant neoplasm of large intestine    Sepsis due to Escherichia coli [E. coli]    Encounter for screening for depression        Immunizations:     None        Allergies:     Last Reviewed on 1/27/2020 05:55 PM by Tommy Garduno    No Known Allergies.        Current Medications:     Last Reviewed on 1/27/2020 05:55 PM by Tommy Garduno    aspirin 325 mg oral tablet [take 1 tablet (325 mg) by oral route every day]    amLODIPine 10 mg oral tablet [TAKE 1 TABLET BY MOUTH ONCE DAILY]    glipiZIDE 10 mg oral tablet [TAKE 1 TABLET BY MOUTH TWICE DAILY]    BD Ultra Fine II Lancet  Lancet [check BS bid  DX: E11.9]    Accu-Chek Anna Plus Test Strips  Reagent Strips [check blood sugar bid  DX E11.9]    BD Ultra-Fine Mini Pen Needle 31G x 3/16\"  Pen Needle [Use BID as directed]    Docusate Sodium 100 mg oral capsule [Take 1 capsule bid for constipation PRN]    Lantus Solostar U-100 Insulin 100 unit/mL (3 mL) subcutaneous Insulin Pen [40 units sub-q qAM ]    aspirin 325 mg oral tablet, delayed release (enteric coated) [TAKE 1 TABLET BY MOUTH EVERY DAY]    NovoLog FlexPen 100units/1ml Injection [5 units before each meal]    carvedilol 6.25 mg oral tablet [TAKE 1 TABLET BY MOUTH TWICE DAILY]    isosorbide mononitrate 30 mg oral Tablet, Extended Release 24 hr [TAKE 1 TABLET BY MOUTH EVERY MORNING]    terbinafine HCl 250 mg oral tablet [take 1 tablet (250 mg) by oral route once daily]    furosemide 20 mg oral tablet [take 1 tablet (20 mg) by oral route once per day]    lisinopril 40 mg oral tablet    atorvastatin 20 mg oral tablet [1 po q hs ]        Objective:        " Vitals:         Current: 1/27/2020 10:30:59 AM    Ht:  5 ft, 6 in;  Wt: 211.2 lbs;  BMI: 34.1T: 98.2 F (oral);  BP: 156/64 mm Hg (left arm, sitting);  P: 75 bpm (left arm (BP Cuff), sitting);  sCr: 2.44 mg/dL;  GFR: 23.08        Exams:     PHYSICAL EXAM:     GENERAL: Vitals recorded well developed, well nourished;  well groomed;  no apparent distress;     EYES: left iris is cloudy and reflective; PERRL, EOMI     E/N/T:  normal EACs, TMs, nasal/oral mucosa, teeth, gingiva, and oropharynx;     NECK: range of motion is normal; trachea is midline;     RESPIRATORY: normal respiratory rate and pattern with no distress; normal breath sounds with no rales, rhonchi, wheezes or rubs;     CARDIOVASCULAR: normal rate; rhythm is regular;  a systolic murmur is noted: it is grade 2/6;  trace pedal edema;     GASTROINTESTINAL: nontender; normal bowel sounds;     LYMPHATIC: no enlargement of cervical or facial nodes;     MUSCULOSKELETAL: gait: slowed and uses a cane;  normal overall tone     NEUROLOGIC: mental status: alert and oriented x 3; GROSSLY INTACT     PSYCHIATRIC: appropriate affect and demeanor; normal speech pattern;         Assessment:         Z13.31   Encounter for screening for depression       I10   Essential (primary) hypertension       E11.40   Type 2 diabetes mellitus with diabetic neuropathy, unspecified           ORDERS:         Meds Prescribed:       [Refilled] isosorbide mononitrate 60 mg oral Tablet, Extended Release 24 hr [take 1 tablet (60 mg) by oral route once daily in the morning], #30 (thirty) tablets, Refills: 2 (two)       [Refilled] Accu-Chek Anna Plus Test Strips [check blood sugar bid  DX E11.9], #100 (one hundred) strips, Refills: 5 (five)         Lab Orders:       APPTO  Appointment need  (In-House)            FUTURE  Future order to be done at patients convenience  (Send-Out)            [id number]  [facility] CBC with 3 part diff  (Send-Out)            [id number]  DIAB - [facility] CMP A1C LIPID AND MICRO  ALBUM CR RATIO: [id number]  (Send-Out)            [id number]  [facility] TSH  (Send-Out)                      Plan:         Encounter for screening for depression    MIPS PHQ-9 Depression Screening: Completed form scanned and in chart; Total Score 0         Essential (primary) hypertensionBP is elevated today so Imdur is increased from 30 to 60 mg qd. Cont lisinopril 40 mg qd, coreg 6.25 BID, amlodipine 10 mg qd, and lasix 20 mg qd were continued. Hydralazine 100 mg TID, clonidine 0.2 mg BID, and HCTZ 25 mg qd were d/c'd 1 month ago.           Prescriptions:       [Refilled] isosorbide mononitrate 60 mg oral Tablet, Extended Release 24 hr [take 1 tablet (60 mg) by oral route once daily in the morning], #30 (thirty) tablets, Refills: 2 (two)         Type 2 diabetes mellitus with diabetic neuropathy, unspecifiedNext A1c is due 2/12/20. Future orders are made for then. Lantus is increased from 40 to 60 units qAM. Otherwise, no changes to DM 2 meds are is made today. Cont novolog 5 units TID, glipizide 10 mg BID, and januvia 50 mg qd.        FOLLOW-UP: Schedule a follow-up visit in 1 month.:.      FOLLOW-UP TESTING #1: FOLLOW-UP LABORATORY:  Labs to be scheduled in the future include CBC, Diabetes Panel 2;CMP, A1C, Lipid, Microalbumin:Creatinine Ratio, and TSH.   Patient to schedule to be performed in 3 weeks.            Prescriptions:       [Refilled] Accu-Chek Anna Plus Test Strips [check blood sugar bid  DX E11.9], #100 (one hundred) strips, Refills: 5 (five)           Orders:       APPTO  Appointment need  (In-House)            FUTURE  Future order to be done at patients convenience  (Send-Out)            69082  [facility] - [facility] CBC with 3 part diff  (Send-Out)            [id number]  DIAB - [facility] CMP A1C LIPID AND MICRO ALBUM CR RATIO: [id number]  (Send-Out)            [id number]  TSH - [facility] TSH  (Send-Out)                  Patient Recommendations:        For  Type 2 diabetes mellitus with diabetic  neuropathy, unspecified:    Schedule a follow-up visit in 1 month.                APPOINTMENT INFORMATION:        Monday Tuesday Wednesday Thursday Friday Saturday Sunday            Time:___________________AM  PM   Date:_____________________         The following laboratory testing has been ordered: CBC TSH Schedule the above testing in 3 weeks.              Charge Capture:         Primary Diagnosis:     Z13.31  Encounter for screening for depression           Orders:      [id number]  Office/outpatient visit; established patient, level 4  (In-House)              I10  Essential (primary) hypertension     E11.40  Type 2 diabetes mellitus with diabetic neuropathy, unspecified           Orders:      APPTO  Appointment need  (In-House)

## 2021-05-18 NOTE — PROGRESS NOTES
Carlos Delgado  1935     Office/Outpatient Visit    Visit Date: Wed, May 27, 2020 11:27 am    Provider: Tommy Garduno MD (Assistant: Fern Rosas MA)    Location: [facility]        Electronically signed by Tommy Garduno MD on  05/27/2020 07:11:37 PM                             Subjective:        CC: Mr. Delgado is a 84 year old Black or  male.  possible shingles         HPI: Pt has      BP today is 153/52 with a HR of 69. He is on Imdur 60 mg qd, lisinopril 40 mg qd, coreg 6.25, amlodipine 10 mg qd, and lasix 20 mg qd. Hydralazine 100 mg TID, clonidine 0.2 mg BID, and HCTZ 25 mg qd were d/c'd during a hospital admission last December. He does not check between visits.      A1c last year was 10.4 -> 6.6 -> 9.4 -> 7.6 on 11/12/19. He is on Lantus 40 units qAM and 20 units qHS; novolog 5 units TID, glipizide 10 mg BID, and januvia 50 mg qd. He check glucose BID and its often very low in the morning (50s/60s); at night it varies, maybe low 200s.      Pt has a rash on left chest and upper/mid-back/left shoulder blade. This is a cluster of blisters with an erythematous base in a dermatomal distribution. Approx T3/T4 level, at the level/just above left nipple. Some lesions over left shoulder blade appear filled with puss. Pt says it hurts at times, feels like ice picks. This appeared about 1 week ago. He does not think he's had a shingles shot.     ROS:     CONSTITUTIONAL:  Negative for fatigue and fever.      EYES:  Positive for lost vision 1 month ago in left eye.      E/N/T:  Negative for diminished hearing and nasal congestion.      CARDIOVASCULAR:  Negative for chest pain and palpitations.      RESPIRATORY:  Positive for dyspnea ( with moderate exertion ).   Negative for recent cough.      GASTROINTESTINAL:  Positive for anorexia ( decreased appetite ), diarrhea and change in stool caliber.   Negative for abdominal pain, constipation, hematochezia ( resolved ), nausea or  vomiting.      MUSCULOSKELETAL:  Negative for arthralgias and myalgias.      INTEGUMENTARY:  Positive for fungal nail infection and (toenails) shingles, T4 left chest and back.      NEUROLOGICAL:  Positive for paresthesias.   Negative for weakness.      PSYCHIATRIC:  Negative for anxiety, depression and sleep disturbance.          Past Medical History / Family History / Social History:         Last Reviewed on 5/27/2020 12:07 PM by Tommy Garduno    Past Medical History:             PAST MEDICAL HISTORY         Chronic Renal Failure: Stage 4;     Colon cancer         CURRENT MEDICAL PROVIDERS:    Nephrologist: Dr. Frausto         PREVENTIVE HEALTH MAINTENANCE             COLORECTAL CANCER SCREENING:; colonoscopy with the following abnormalities noted-- 1 tubular adenoma and Polyp(s); 6/17/19 - Dr. Mariscal         Surgical History:         Positive for    Cataract Removal: bilateral; ;     Positive for    Colonoscopy ( 2016;; );         Family History:     Family Medical History Unremarkable         Social History:     Occupation:    Retired         Tobacco/Alcohol/Supplements:     Last Reviewed on 5/27/2020 12:07 PM by Tommy Garduno    Tobacco: He has a past history of cigarette smoking; quit date:  1975.  Non-drinker     Caffeine:  He admits to consuming caffeine via coffee ( 2 servings per day ).          Substance Abuse History:     Last Reviewed on 5/27/2020 12:07 PM by Tommy Garduno    None         Mental Health History:     Last Reviewed on 5/27/2020 12:07 PM by Tommy Garduno        Communicable Diseases (eg STDs):     Last Reviewed on 5/27/2020 12:07 PM by Tommy Garduno        Current Problems:     Last Reviewed on 5/27/2020 12:07 PM by Tommy Garduno    Type 2 diabetes mellitus with diabetic neuropathy, unspecified    Essential (primary) hypertension    Other atherosclerosis of native arteries of extremities, unspecified extremity    Cardiac murmur, unspecified    Other iron deficiency  "anemias    Constipation, unspecified    Type 2 diabetes mellitus with unspecified complications    Hereditary and idiopathic neuropathy, unspecified    Other long term (current) drug therapy    Cerebral ischemia    Chronic diastolic (congestive) heart failure    Chronic kidney disease, stage 4 (severe)    Anemia in chronic kidney disease    Unqualified visual loss, right eye, normal vision left eye    Encounter for follow-up examination after completed treatment for conditions other than malignant neoplasm    Personal history of other malignant neoplasm of large intestine    Sepsis due to Escherichia coli [E. coli]    Encounter for screening for depression    Zoster with other complications        Immunizations:     None        Allergies:     Last Reviewed on 5/27/2020 12:07 PM by Tommy Garduno    No Known Allergies.        Current Medications:     Last Reviewed on 5/27/2020 12:07 PM by Tommy Garduno    lisinopril 40 mg oral tablet [TAKE 1 TABLET BY MOUTH TWICE DAILY]    amLODIPine 10 mg oral tablet [TAKE 1 TABLET BY MOUTH ONCE DAILY]    glipiZIDE 10 mg oral tablet [TAKE 1 TABLET BY MOUTH TWICE DAILY]    Accu-Chek Anna Plus Test Strips  [check blood sugar bid  DX E11.9]    BD Ultra Fine II Lancet  Lancet [check BS bid  DX: E11.9]    Docusate Sodium 100 mg oral capsule [Take 1 capsule bid for constipation PRN]    Lantus Solostar U-100 Insulin 100 unit/mL (3 mL) subcutaneous Insulin Pen [INJECT 40 UNITS SUB-Q EVERY MORNING AND 20 UNITS EVERY NIGHT AT BEDTIME]    Easy Touch 32 gauge x 3/16\" needle  [USE TWICE DAILY WITH LANTUS INJECTIONS]    atorvastatin 20 mg oral tablet [1 po q hs ]    carvedilol 6.25 mg oral tablet [TAKE 1 TABLET BY MOUTH TWICE DAILY]    NovoLog FlexPen 100units/1ml Injection [5 units before each meal]    aspirin 325 mg oral tablet, delayed release (enteric coated) [TAKE 1 TABLET BY MOUTH EVERY DAY]    isosorbide mononitrate 60 mg oral Tablet, Extended Release 24 hr [TAKE ONE TABLET EVERY " DAY]    Januvia 50 mg oral tablet [1 tab daily]    terbinafine HCl 250 mg oral tablet [take 1 tablet (250 mg) by oral route once daily]    furosemide 20 mg oral tablet [take 1 tablet (20 mg) by oral route once per day]    aspirin 325 mg oral tablet [take 1 tablet (325 mg) by oral route every day]        Objective:        Vitals:         Current: 5/27/2020 11:34:10 AM    Ht:  5 ft, 6 in;  Wt: 220.2 lbs;  BMI: 35.5T: 99.2 F (oral);  BP: 153/52 mm Hg (left arm, sitting);  P: 69 bpm (left arm (BP Cuff), sitting);  sCr: 2.44 mg/dL;  GFR: 23.50        Exams:     PHYSICAL EXAM:     GENERAL: Vitals recorded well developed, well nourished;  well groomed;  no apparent distress;     EYES: left iris is cloudy and reflective; PERRL, EOMI     E/N/T:  normal EACs, TMs, nasal/oral mucosa, teeth, gingiva, and oropharynx;     NECK: range of motion is normal; trachea is midline;     RESPIRATORY: normal respiratory rate and pattern with no distress; normal breath sounds with no rales, rhonchi, wheezes or rubs;     CARDIOVASCULAR: normal rate; rhythm is regular;  a systolic murmur is noted: it is grade 2/6;  trace pedal edema;     GASTROINTESTINAL: nontender; normal bowel sounds;     SKIN: rash on left chest and upper/mid-back/left shoulder blade. This is a cluster of blisters with an erythematous base in a dermatomal distribution. Approx T3/T4 level, at the level/just above left nipple.;     MUSCULOSKELETAL: gait: slowed and uses a cane;  normal overall tone     NEUROLOGIC: mental status: alert and oriented x 3; GROSSLY INTACT     PSYCHIATRIC: appropriate affect and demeanor; normal speech pattern;         Assessment:         I10   Essential (primary) hypertension       E11.40   Type 2 diabetes mellitus with diabetic neuropathy, unspecified       B02.8   Zoster with other complications           ORDERS:         Meds Prescribed:       [New Rx] gabapentin 300 mg oral capsule [take 1 capsule (300 mg) by oral route 3 times per day], #90  (ninety) capsules, Refills: 0 (zero)       [New Rx] valACYclovir 1 gram oral tablet [take 1 tablet (1,000 mg) by oral route 2 times per day], #14 (fourteen) tablets, Refills: 0 (zero)       [Refilled] NovoLOG Flexpen U-100 Insulin 100 unit/mL (3 mL) subcutaneous Insulin Pen [7 units before each meal], #15 (fifteen) milliliters, Refills: 2 (two)       [Refilled] Lantus Solostar U-100 Insulin 100 unit/mL (3 mL) subcutaneous Insulin Pen [INJECT 40 UNITS SUB-Q EVERY MORNING AND 10 UNITS EVERY NIGHT AT BEDTIME], #15 (fifteen) milliliters, Refills: 2 (two)       [Refilled] isosorbide mononitrate 30 mg oral Tablet, Extended Release 24 hr [take 1 tablet (30 mg) by oral route once qHS], #30 (thirty) tablets, Refills: 2 (two)         Lab Orders:       [id number]  BDCBC - [facility] CBC with 3 part diff  (Send-Out)            [id number]  COMP - [facility] Comp. Metabolic Panel  (Send-Out)            [id number]  A1CEG - [facility] Hemoglobin A1C  (Send-Out)            [id number]  TSH - [facility] TSH  (Send-Out)                      Plan:         Essential (primary) hypertensionBP remains elevated so imdur is increased from 60 to 90 mg qd (60+30 mg qd). Cont lisinopril 40 mg qd, coreg 6.25, amlodipine 10 mg qd, and lasix 20 mg qd.           Prescriptions:       [Refilled] isosorbide mononitrate 30 mg oral Tablet, Extended Release 24 hr [take 1 tablet (30 mg) by oral route once qHS], #30 (thirty) tablets, Refills: 2 (two)         Type 2 diabetes mellitus with diabetic neuropathy, unspecifiedCont lantus 40 units qAM; evening dose is decreased from 20 to 10 units. Novolog is increased from 5 to 7 units TID. Cont glipizide 10 mg BID, and januvia 50 mg qd.    LABORATORY:  Labs ordered to be performed today include CBC, Comprehensive metabolic panel, HgbA1C, and TSH.            Prescriptions:       [Refilled] NovoLOG Flexpen U-100 Insulin 100 unit/mL (3 mL) subcutaneous Insulin Pen [7 units before each meal], #15 (fifteen) milliliters, Refills: 2 (two)       [Refilled] Lantus  Solostar U-100 Insulin 100 unit/mL (3 mL) subcutaneous Insulin Pen [INJECT 40 UNITS SUB-Q EVERY MORNING AND 10 UNITS EVERY NIGHT AT BEDTIME], #15 (fifteen) milliliters, Refills: 2 (two)           Orders:       [id number]  BDCBC - [facility] CBC with 3 part diff  (Send-Out)            82012  COMP - [facility] Comp. Metabolic Panel  (Send-Out)            73902  A1CEG - [facility] Hemoglobin A1C  (Send-Out)            [id number]  TSH - [facility] TSH  (Send-Out)              Zoster with other complicationsPt has significant case of shingles in approximately T4 distribution, left side although his pain currently is not as bad as expected. Valacyclovir 1 g BID and gabapentin 300 mg TID prescribed to shorten duration of illness and alleviate pain.           Prescriptions:       [New Rx] gabapentin 300 mg oral capsule [take 1 capsule (300 mg) by oral route 3 times per day], #90 (ninety) capsules, Refills: 0 (zero)       [New Rx] valACYclovir 1 gram oral tablet [take 1 tablet (1,000 mg) by oral route 2 times per day], #14 (fourteen) tablets, Refills: 0 (zero)             Charge Capture:         Primary Diagnosis:     I10  Essential (primary) hypertension           Orders:      [id number]  Office/outpatient visit; established patient, level 4  (In-House)              E11.40  Type 2 diabetes mellitus with diabetic neuropathy, unspecified     B02.8  Zoster with other complications

## 2021-05-18 NOTE — PROGRESS NOTES
Carlos Delgado  1935     Office/Outpatient Visit    Visit Date: Tue, Nov 12, 2019 10:24 am    Provider: Tommy Garduno MD (Assistant: Alondra Magallanes MA)    Location: [facility]        Electronically signed by Tommy Garduno MD on  11/12/2019 06:46:40 PM                             Subjective:        CC: Mr. Delgado is a 84 year old Black or  male.  This is a follow-up visit.  check up         HPI:       BP today is 114/47 with a HR of 68. He is on Imdur 30 mg qd, lisinopril 40 mg qd, coreg 6.25, HCTZ 25 mg qd, amlodipine 10 mg qd, hydralazine 100 mg TID, clonidine 0.2 mg BID.       A1c has been somewhat eradic this year from 10.4 -> 6.6 -> 9.4 on 8/26/19. He is on lantus 40 units BID, novolog 5 units BID, glipizide 10 mg BID, and januvia 50 mg qd. He checks glucose 2 or 3x/day and its varies a lot, anywhere from 61 to 318. Hypoglycemia is typically around 7am and then high readings tend to be later in the day. Some times [facility] will serve a supper he doesn't like and he won't eat much that night. *** 3 weeks ago glucose was 554 and sodium was 125 and osmolality was elevated. I called patient's son to advise he needed to go to the ER for HHS but pt and son did not want to go and glucose improved the next day. Pt denies any Sx and reports he feels good.      Cr this year has been 1.9 -> 2.38 -> 2.2 -> 2.4 -> 2.73 -> 2.26 -> 2.42-> 4.05 on 9/23/19. Pt saw Dr. Frausto on 7/1 and then nurse practitioner on 7/15. Unsure of next apt. No change in urination, just urinary frequency and nocturia.      Hgb this year has been 10.5 -> 9.8 -> 9.2 -> 9.8 -> 9.3 on 9/23/19. Colonoscopy 6/17/19 showed 1 polyp and minimal internal hemorrhoids. No worsening fatigue, no dizziness or lightheadedness. No blood in stool or black tarry stools. He still has constipation, BM every 3-4 days although loose and watery.      BLE edema has overall improved. He is on lisinopril 40 mg qd, coreg  6.25 mg BID, and lasix 40 mg qAM and 1/2 tab qHS. ECHO done 1/30/19 shows diastolic heart failure. Pt continues to feel well and denies any complaints. He walks with a walker.      Pt reports very blurry vision in left eye for the last week. Last visit with Dr. Neri has been a few years ago. It feels like there is something in it like a grain of sand.       Pt has dark, thick left great toenail and 2nd toenail of right foot. He has dark spots on posterior heels bilaterally. [facility] but does a brief toenail clipping but nothing else.     ROS:     CONSTITUTIONAL:  Negative for fatigue and fever.      EYES:  Positive for blurred vision ( left eye ).      E/N/T:  Negative for diminished hearing and nasal congestion.      CARDIOVASCULAR:  Negative for chest pain and palpitations.      RESPIRATORY:  Positive for dyspnea ( with mild exertion ).   Negative for recent cough.      GASTROINTESTINAL:  Positive for constipation, diarrhea and change in stool caliber.   Negative for abdominal pain, hematochezia ( resolved ), nausea or vomiting.      MUSCULOSKELETAL:  Positive for limb pain ( left leg pain; left foot ).   Negative for arthralgias or myalgias.      INTEGUMENTARY:  Positive for fungal nail infection (toenails).      NEUROLOGICAL:  Positive for paresthesias.   Negative for weakness.      PSYCHIATRIC:  Negative for anxiety, depression and sleep disturbance.          Past Medical History / Family History / Social History:         Last Reviewed on 11/12/2019 06:33 PM by Tommy Garduno    Past Medical History:             PAST MEDICAL HISTORY         Colon cancer         CURRENT MEDICAL PROVIDERS:    Nephrologist         PREVENTIVE HEALTH MAINTENANCE             COLORECTAL CANCER SCREENING:; colonoscopy with the following abnormalities noted-- 1 tubular adenoma and Polyp(s); 6/17/19 - Dr. Mariscal         Surgical History:         Positive for    Cataract Removal: bilateral; ;      Positive for    Colonoscopy ( 2016;; );         Family History:     Family Medical History Unremarkable         Social History:     Occupation:    Retired         Tobacco/Alcohol/Supplements:     Last Reviewed on 11/12/2019 06:33 PM by Tommy Garduno    Tobacco: He has a past history of cigarette smoking; quit date:  1975.  Non-drinker     Caffeine:  He admits to consuming caffeine via coffee ( 2 servings per day ).          Substance Abuse History:     Last Reviewed on 11/12/2019 06:33 PM by Tommy Garduno    None         Mental Health History:     Last Reviewed on 11/12/2019 06:33 PM by Tommy Garduno        Communicable Diseases (eg STDs):     Last Reviewed on 11/12/2019 06:33 PM by Tommy Garduno        Current Problems:     Last Reviewed on 11/12/2019 06:33 PM by Tommy Garduno    Atherosclerosis of native extremity arteries (LINDA 0.7 L/R IN 2017)    Type 2 diabetes mellitus with diabetic neuropathy, unspecified    Essential (primary) hypertension    Other atherosclerosis of native arteries of extremities, unspecified extremity    Cardiac murmur, unspecified    Heart murmur, neg ECHO 2017    HTN    History of colon cancer    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    Iron deficiency anemia    Other iron deficiency anemias    Constipation, unspecified    Constipation (chronic)    Type 2 DM    Type 2 diabetes mellitus with unspecified complications    Hereditary and idiopathic neuropathy, unspecified    Patient visit for long term (current) drug use; other    Other long term (current) drug therapy    Acute stroke    Cerebral ischemia    Diastolic heart failure, chronic    Chronic diastolic (congestive) heart failure    Chronic kidney disease, stage 4 (severe)    Chronic kidney disease, Stage IV (severe)    Change in bowel habit    Anemia in chronic kidney disease    Anemia in chronic kidney disease    Change in bowel habits    Tinea unguium    Unqualified visual loss, right eye, normal  "vision left eye        Immunizations:     None        Allergies:     Last Reviewed on 11/12/2019 06:33 PM by Tommy Garduno    No Known Allergies.        Current Medications:     Last Reviewed on 11/12/2019 06:33 PM by Tommy Garduno    Lisinopril 40mg Tablet [Take 1 tablet(s) by mouth bid]    Amlodipine  10mg Tablet [1 tab daily]    Glipizide 10mg Tablets [1 tab bid]    Hydrochlorothiazide (HCTZ) 12.5mg Tablet [1 po daily]    Accu-Chek Anna Plus Test Strips  Reagent Strips [check blood sugar bid  DX E11.9]    BD Ultra Fine II Lancet  Lancet [check BS bid  DX: E11.9]    Docusate Sodium 100mg Capsules [Take 1 capsule bid for constipation PRN]    Senna 8.6mg Tablet [Take 1 tablet(s) by mouth bid]    BD Ultra-Fine Mini Pen Needle 31G x 3/16\"  Pen Needle [Use BID as directed]    Lantus SoloSTAR 100units/1ml Injection [40 units sub-q bid]    Hydralazine HCl 100mg Tablet [Take 1 tablet(s) by mouth tid]    NovoLog FlexPen 100units/1ml Injection [5 units before each meal]    Aspirin (ASA) 325mg Tablet [1 tab daily]    Atorvastatin Calcium 20mg Tablet [1 po q hs ]    Furosemide 40mg Tablets [1 tab qAM and 1/2 tab qHS.]    Catapres 0.2mg Tablets [Take 1 tablet(s) by mouth bid]    Isosorbide Mononitrate 30mg Tablets, Extended Release [Take 1 tablet(s) by mouth qam]    Januvia 50mg Tablet [1 tab daily]        Objective:        Vitals:         Current: 11/12/2019 10:31:34 AM    Ht:  5 ft, 6 in;  Wt: 213.6 lbs;  BMI: 34.5T: 97.5 F (oral);  BP: 114/47 mm Hg (left arm, sitting);  P: 68 bpm (left arm (BP Cuff), sitting);  sCr: 4.05 mg/dL;  GFR: 13.97        Exams:     PHYSICAL EXAM:     GENERAL: Vitals recorded well developed, well nourished;  well groomed;  no apparent distress;     E/N/T:  normal EACs, TMs, nasal/oral mucosa, teeth, gingiva, and oropharynx;     NECK: range of motion is normal; trachea is midline;     RESPIRATORY: normal respiratory rate and pattern with no distress; normal breath sounds with no rales, " rhonchi, wheezes or rubs;     CARDIOVASCULAR: normal rate; rhythm is regular;  a systolic murmur is noted: it is grade 2/6;  trace pedal edema;     GASTROINTESTINAL: nontender; normal bowel sounds;     SKIN: onychomycosis of toe(s);     MUSCULOSKELETAL: gait: slowed;  normal overall tone     NEUROLOGIC: mental status: alert and oriented x 3; GROSSLY INTACT     PSYCHIATRIC: appropriate affect and demeanor; normal speech pattern;         Assessment:         I10   Essential (primary) hypertension       E11.8   Type 2 diabetes mellitus with unspecified complications       N18.4   Chronic kidney disease, stage 4 (severe)       D63.1   Anemia in chronic kidney disease       I50.32   Chronic diastolic (congestive) heart failure       H54.61   Unqualified visual loss, right eye, normal vision left eye       B35.1   Tinea unguium           ORDERS:         Meds Prescribed:       [New Rx] terbinafine HCl 250 mg oral tablet [take 1 tablet (250 mg) by oral route once daily], #30 (thirty) tablets, Refills: 2 (two)         Lab Orders:       00385  BDCBC - [facility] CBC with 3 part diff  (Send-Out)            92556  COMP - [facility] Comp. Metabolic Panel  (Send-Out)            89628  A1CEG - [facility] Hemoglobin A1C  (Send-Out)            20192  TSH - [facility] TSH  (Send-Out)            08818  NTBNP - H B-Type Natriurectic peptide  (Send-Out)            44666  FERR - [facility] Ferritin Serum  (Send-Out)            55324  FOL - [facility] Folate; folic acid serum  (Send-Out)            99240  RETEC - [facility] Reticulocyte count  (Send-Out)            22524  IRONP - [facility] Iron and TIBC  (Send-Out)            67032  VB12 - H Vitamin B12  (Send-Out)              Procedures Ordered:       REFER  Referral to Specialist or Other Facility  (Send-Out)            REFER  Referral to Specialist or Other Facility  (Send-Out)            REFER  Referral to Specialist or Other Facility  (Send-Out)                      Plan:         Essential (primary) hypertensionBP well controlled,  cont Imdur 30 mg qd, lisinopril 40 mg qd, coreg 6.25, HCTZ 25 mg qd, amlodipine 10 mg qd, hydralazine 100 mg TID, clonidine 0.2 mg BID.         Type 2 diabetes mellitus with unspecified complicationsDM 2 is poorly controlled and this is in large part due to patient preference to enjoy his senior years and not wanting to worry too much about blood sugar at the age of 84. This is understandable, particularly as pt feels well and has no complaints despite a very erratic glucose log. It is likely that hypoglycemia happens the morning after pt eats a small dinner and so he is advised to try and maintain a consistent diet. With such a wide variation in glucose it is hard to adjust medications. Will recheck labs today.     LABORATORY:  Labs ordered to be performed today include CBC, Comprehensive metabolic panel, HgbA1C, and TSH.            Orders:       [id number]  BDCBC - [facility] CBC with 3 part diff  (Send-Out)            05867  COMP - H Comp. Metabolic Panel  (Send-Out)            97225  A1CEG - [facility] Hemoglobin A1C  (Send-Out)            57377  TSH - H TSH  (Send-Out)              Chronic kidney disease, stage 4 (severe)Most recent Cr was very high and patient and son declined to go to the ER. Will recheck today and try to arrange for close f/u with nephrology.         REFERRALS:  Referral initiated to a nephrologist ( [place] Frausto/[facility] ).            Orders:       REFER  Referral to Specialist or Other Facility  (Send-Out)              Anemia in chronic kidney diseaseHgb this year has been trending down in setting of CKD and DM 2 (10.5 -> 9.8 -> 9.2 -> 9.8 -> 9.3). This may be from progressive CKD, will check labs today.     LABORATORY:  Labs ordered to be performed today include Anemia profile ferritin Folate Reticulocyte ct Serum iron Vitamin B12.            Orders:       21211  FERR - [facility] Ferritin Serum  (Send-Out)            34875  FOL - [facility] Folate; folic acid serum  (Send-Out)            26687  RETEC - [facility] Reticulocyte  count  (Send-Out)            54458  IRONP - [facility] Iron and TIBC  (Send-Out)            62088  VB12 - H Vitamin B12  (Send-Out)              Chronic diastolic (congestive) heart failureThis appears to be stable, will recheck BNP today.     LABORATORY:  Labs ordered to be performed today include BNP.            Orders:       46558  NTBNP - [facility] B-Type Natriurectic peptide  (Send-Out)              Unqualified visual loss, right eye, normal vision left eye        REFERRALS:  Referral initiated to an ophthalmologist ( Dr. Karen Neri ).  STAT referral, if possible today.           Orders:       REFER  Referral to Specialist or Other Facility  (Send-Out)              Tinea unguiumterbinafine prescribed and pt referred to podiatry.        REFERRALS:  Referral initiated to a podiatrist ( [facility]en [facility] ).            Prescriptions:       [New Rx] terbinafine HCl 250 mg oral tablet [take 1 tablet (250 mg) by oral route once daily], #30 (thirty) tablets, Refills: 2 (two)           Orders:       REFER  Referral to Specialist or Other Facility  (Send-Out)                  Charge Capture:         Primary Diagnosis:     I10  Essential (primary) hypertension           Orders:      41027  Office/outpatient visit; established patient, level 4  (In-House)              E11.8  Type 2 diabetes mellitus with unspecified complications     N18.4  Chronic kidney disease, stage 4 (severe)     D63.1  Anemia in chronic kidney disease     I50.32  Chronic diastolic (congestive) heart failure     H54.61  Unqualified visual loss, right eye, normal vision left eye     B35.1  Tinea unguium

## 2021-05-18 NOTE — PROGRESS NOTES
Delgado Thomas 1935     Office/Outpatient Visit    Visit Date: Fri, May 10, 2019 09:38 am    Provider: Tommy Garduno MD (Assistant: Alondra Magallanes MA)    Location: [facility]        Electronically signed by Tommy Garduno MD on  05/10/2019 06:38:07 PM                             SUBJECTIVE:        CC:     Mr. Delgado, is a 83 year old Black or  male.  This is a follow-up visit.          HPI:     Baseline Cr appears to be around 2.3. CKD IV, pt has appointment with Dr. Frausto 7/1/19. Urine output normal.     Pt saw Dr. Zuniga yesterday. No changes to medicines were made. We are still not sure if he's on metoprolol or carvedilol.     A1c was 6.6 judt 4 days ago. He is on lantus 50 units BID, novolog 5 units TID w/ meals and glipizide 10 mg. Glucose was 100 this morning and previously in 70s. He denies any symptoms with hypoglycemia.     BP remains elevated today. Denies headaches or blurry vision.     ROS:     CONSTITUTIONAL:  Negative for fatigue and fever.      EYES:  Negative for blurred vision.      E/N/T:  Negative for diminished hearing and nasal congestion.      CARDIOVASCULAR:  Negative for chest pain and palpitations.      RESPIRATORY:  Negative for recent cough and dyspnea.      GASTROINTESTINAL:  Positive for hematochezia and hemorrhoids.   Negative for abdominal pain, constipation, diarrhea, nausea or vomiting.      MUSCULOSKELETAL:  Positive for limb pain ( left leg pain ).   Negative for arthralgias or myalgias.      NEUROLOGICAL:  Positive for paresthesias.   Negative for weakness.      PSYCHIATRIC:  Negative for anxiety, depression and sleep disturbance.          PMH/FMH/SH:     Last Reviewed on 5/10/2019 06:37 PM by Tommy Garduno    Past Medical History:             PAST MEDICAL HISTORY         Colon cancer         CURRENT MEDICAL PROVIDERS:    Nephrologist         Surgical History:         Positive for    Cataract Removal: bilateral; ;     Positive for     Colonoscopy ( 2016;; );         Family History:     Family Medical History Unremarkable         Social History:     Occupation:    Retired         Tobacco/Alcohol/Supplements:     Last Reviewed on 5/10/2019 06:37 PM by Tommy Garduno    Tobacco: He has a past history of cigarette smoking; quit date:  1975.  Non-drinker     Caffeine:  He admits to consuming caffeine via coffee ( 2 servings per day ).          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         Current Medications:     Last Reviewed on 5/10/2019 06:37 PM by Tommy Garduno    Hydralazine HCl 100mg Tablet Take 1 tablet(s) by mouth tid     Lantus SoloSTAR 100units/1ml Injection 50 units SC BID     Docusate Sodium 100mg Capsules Take 1 capsule bid for constipation PRN     Glipizide 10mg Tablets 1 tab bid     Senna 8.6mg Tablet Take 2 tablets po BID, prn for constipation HOLD FOR DIARRHEA     Clonidine HCl 0.2mg Tablets ONE BID     Accu-Chek Anna Plus Test Strips  Reagent  "Strips check blood sugar bid  DX E11.9     Amlodipine  10mg Tablet 1 tab daily     BD Ultra Fine II Lancet  Lancet check BS bid  DX: E11.9     Hydrochlorothiazide (HCTZ) 25mg Tablet 1 tab daily     Lisinopril 40mg Tablet 1 tab daily     Metoprolol Succinate 25mg Tablets, Extended Release 1 tablet BID     BD Ultra-Fine Mini Pen Needle 31G x 3/16\"  Pen Needle Use BID as directed     Furosemide 40mg Tablets 1 by mouth  daily     Atorvastatin Calcium 80mg Tablet 1 tab hs     Carvedilol 6.25mg Tablet 1 tab bid     Aspirin (ASA) 325mg Tablet 1 tab daily     NovoLog FlexPen 100units/1ml Injection 5 units before each meal         OBJECTIVE:        Vitals:         Current: 5/10/2019 9:43:29 AM    Ht:  5 ft, 6 in;  Wt: 227 lbs;  BMI: 36.6    T: 98.2 F (oral);  BP: 155/50 mm Hg (left arm, sitting);  P: 71 bpm (left arm (BP Cuff), sitting);  sCr: 2.26 mg/dL;  GFR: 26.13        Exams:     PHYSICAL EXAM:     GENERAL: Vitals recorded well developed, well nourished;  well groomed;  no apparent distress;     E/N/T:  normal EACs, TMs, nasal/oral mucosa, teeth, gingiva, and oropharynx;     RESPIRATORY: normal respiratory rate and pattern with no distress; normal breath sounds with no rales, rhonchi, wheezes or rubs;     CARDIOVASCULAR: normal rate; rhythm is regular;  a systolic murmur is noted: it is grade 2/6;  2+ pedal and pitting to just below the knees bilaterally edema;     GASTROINTESTINAL: nontender; normal bowel sounds;     MUSCULOSKELETAL: gait: slowed;  normal overall tone     NEUROLOGIC: mental status: alert and oriented x 3; cranial nerves II-XII grossly intact; GROSSLY INTACT     PSYCHIATRIC: appropriate affect and demeanor; normal speech pattern;         ASSESSMENT           585.4   N18.4  Chronic kidney disease, Stage IV (severe)              DDx:     428.32   I50.32  Diastolic heart failure, chronic              DDx:     250.00   E11.8  Type 2 DM              DDx:     401.1   I10  HTN              DDx:         ORDERS: "         Meds Prescribed:       Refill of: Lisinopril 40mg Tablet Take 1 tablet(s) by mouth bid  #60 (Sixty) tablet(s) Refills: 2       Januvia (Sitagliptin Phosphate) 25mg Tablet Take 1 tablet(s) by mouth daily  #30 (Thirty) tablet(s) Refills: 0       Refill of: Furosemide 40mg Tablets 1 tab qAM and 1/2 tab qHS.  #45 (Forty Five) tablet(s) Refills: 1       Refill of: Lantus SoloSTAR (Insulin Glargine (rDNA)) 100units/1ml Injection 40 units sub-q bid  #1 (One) prefilled pen Refills: 2                 PLAN:          Chronic kidney disease, Stage IV (severe) Patient and son were encouraged to keep apt with Dr. Frausto as CKD IV and heart failure can be difficult to balance a specialists input will be needed.          Diastolic heart failure, chronic Lasix increased from 40 mg to 60 mg qd given persistent edema. Son counseled to watch for dehydration or fatigue as this may be a sign of worsening kidney function.           Prescriptions:       Refill of: Furosemide 40mg Tablets 1 tab qAM and 1/2 tab qHS.  #45 (Forty Five) tablet(s) Refills: 1          Type 2 DM Due to hypoglyemia, we will decrease lantus from 50 to 40 units BID and add januvia.           Prescriptions:       Januvia (Sitagliptin Phosphate) 25mg Tablet Take 1 tablet(s) by mouth daily  #30 (Thirty) tablet(s) Refills: 0       Refill of: Lantus SoloSTAR (Insulin Glargine (rDNA)) 100units/1ml Injection 40 units sub-q bid  #1 (One) prefilled pen Refills: 2          HTN Son will call back and let us know if pt is on metoprolol or carvedilol. Lisinopril increased from qd to BID.           Prescriptions:       Refill of: Lisinopril 40mg Tablet Take 1 tablet(s) by mouth bid  #60 (Sixty) tablet(s) Refills: 2             CHARGE CAPTURE           **Please note: ICD descriptions below are intended for billing purposes only and may not represent clinical diagnoses**        Primary Diagnosis:         585.4 Chronic kidney disease, Stage IV (severe)            N18.4     Chronic kidney disease, stage 4 (severe)              Orders:          [id number]   Office/outpatient visit; established patient, level 4  (In-House)           428.32 Diastolic heart failure, chronic            I50.32    Chronic diastolic (congestive) heart failure    250.00 Type 2 DM            E11.8    Type 2 diabetes mellitus with unspecified complications    401.1 HTN            I10    Essential (primary) hypertension

## 2021-05-18 NOTE — PROGRESS NOTES
Delgado Thomas 1935     Office/Outpatient Visit    Visit Date: Mon, May 6, 2019 09:42 am    Provider: Tommy Garduno MD (Assistant: Gillian Lara LPN)    Location: [facility]        Electronically signed by Tommy Garduno MD on  05/06/2019 02:07:55 PM                             SUBJECTIVE:        CC:     Mr. Delgado, is a 83 year old Black or  male.  This is a follow-up visit.  He presents with blood in stool.          HPI:     Pt has blood leakage from rectum. This is not necessarily associated with bowel movements. Pt has been having these episodes intermittently for about a year, happens rarely, only a couple times a year. About 3 days ago pt was noted to have blood on his chair after lunch. Blood had soaked through his underwear and pants. It had clumps and clots in it. Pt was referred to Dr. Mariscal a year ago but he missed his appointment in March 2018.     Pt is on hydralazine 100 mg, amlodipine, HCTZ, lisinopril, clonidine, and either metorpolol or carvedilol. BP moderately elevated today.     Pt reports blood sugar is like a yo-yo. Was 199 this morning. He checks it twice a day and its been as high as 349 although can be in 80s in the morning. He is on lantus 50 units BD, humalog 5 units TID, glipizide 100 mg BID. His diet was [facility] at [facility] over a month ago.     ECHO done 1/30/19 shows diastolic heart failure. Pt overall feels well but has severe pitting edema of BLE. He walks with a walker and denies shortness of breath.     ROS:     CONSTITUTIONAL:  Negative for fatigue and fever.      EYES:  Negative for blurred vision.      E/N/T:  Negative for diminished hearing and nasal congestion.      CARDIOVASCULAR:  Negative for chest pain and palpitations.      RESPIRATORY:  Negative for recent cough and dyspnea.      GASTROINTESTINAL:  Positive for hematochezia and hemorrhoids.   Negative for abdominal pain, constipation, diarrhea, nausea or vomiting.      MUSCULOSKELETAL:   Positive for limb pain ( left leg pain ).   Negative for arthralgias or myalgias.      NEUROLOGICAL:  Positive for paresthesias.   Negative for weakness.      PSYCHIATRIC:  Negative for anxiety, depression and sleep disturbance.          PMH/FMH/SH:     Last Reviewed on 5/06/2019 01:00 PM by Tommy Garduno    Past Medical History:             PAST MEDICAL HISTORY         Colon cancer         CURRENT MEDICAL PROVIDERS:    Nephrologist         Surgical History:         Positive for    Cataract Removal: bilateral; ;     Positive for    Colonoscopy ( 2016;; );         Family History:     Family Medical History Unremarkable         Social History:     Occupation:    Retired         Tobacco/Alcohol/Supplements:     Last Reviewed on 5/06/2019 01:00 PM by Tommy Garduno    Tobacco: He has a past history of cigarette smoking; quit date:  1975.  Non-drinker     Caffeine:  He admits to consuming caffeine via coffee ( 2 servings per day ).          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         Current Medications:     Last Reviewed on 5/06/2019 01:00 PM by Tommy Garduno    Hydralazine HCl 100mg Tablet Take 1 tablet(s) by mouth tid     Lantus SoloSTAR 100units/1ml Injection 50 units SC BID     Docusate Sodium 100mg Capsules Take 1 capsule bid for constipation PRN     Glipizide 10mg Tablets 1 tab bid     Senna 8.6mg Tablet Take 2 tablets po BID, prn for constipation HOLD FOR DIARRHEA     Clonidine HCl 0.2mg Tablets ONE BID     Accu-Chek Anna Plus Test Strips  Reagent Strips check blood sugar bid  DX E11.9     Amlodipine  10mg Tablet 1 tab daily     BD Ultra Fine II Lancet  Lancet check BS bid  DX: E11.9     Hydrochlorothiazide (HCTZ) 25mg Tablet 1 tab daily     Lisinopril 40mg Tablet 1 tab daily     Metoprolol Succinate 25mg Tablets, Extended Release 1 tablet BID     BD Ultra-Fine Mini Pen Needle 31G x 3/16\"  Pen Needle Use BID as directed     Atorvastatin Calcium 80mg Tablet 1 tab hs     Carvedilol 6.25mg Tablet 1 tab bid     Furosemide 20mg Tablets 1 tab daily     Aspirin (ASA) 325mg Tablet 1 tab daily     NovoLog FlexPen 100units/1ml Injection 5 units before each meal         OBJECTIVE:        Vitals:         Current: 5/6/2019 9:50:17 AM    Ht:  5 ft, 6 in;  Wt: 226.8 lbs;  BMI: 36.6    T: 98.3 F (oral);  BP: 154/54 mm Hg (left arm, sitting);  P: 72 bpm (left arm (BP Cuff), sitting);  sCr: 2.73 mg/dL;  GFR: 21.62        Repeat:     9:52:18 AM     BP:   147/51mm Hg (left arm, sitting)     9:52:37 AM     P:   66bpm (left arm (BP Cuff), sitting)         Exams:     PHYSICAL EXAM:     GENERAL: Vitals recorded well developed, well nourished;  well groomed;  no apparent distress;     RESPIRATORY: normal respiratory rate and pattern with no distress; normal breath sounds with no rales, rhonchi, wheezes or rubs;     CARDIOVASCULAR: normal rate; rhythm is regular;  a systolic murmur is noted: it is grade 2/6;  2+ pedal and pitting edema;     GASTROINTESTINAL: nontender; rectal exam: normal " tone;   (+) internal hemorrhoid(s);  stool grossly positive for occult blood;     MUSCULOSKELETAL: gait: slowed;  muscle strength: 5/5 in all major muscle groups;     NEUROLOGIC: mental status: alert and oriented x 3; cranial nerves II-XII grossly intact; GROSSLY INTACT     PSYCHIATRIC: appropriate affect and demeanor; normal speech pattern;         ASSESSMENT           578.1   K92.1  Hematochezia              DDx:     401.1   I10  HTN              DDx:     250.00   E11.8  Type 2 DM              DDx:     428.32   I50.32  Diastolic heart failure, chronic              DDx:         ORDERS:         Meds Prescribed:       Refill of: Furosemide 40mg Tablets 1 by mouth  daily  #30 (Thirty) tablet(s) Refills: 1         Lab Orders:       [id number]  BDCBC - [facility] CBC with 3 part diff  (Send-Out)         [id number]  DIAB - [facility] CMP A1C LIPID AND MICRO ALBUM CR RATIO: [id number]  (Send-Out)         26355  TSH - [facility] TSH  (Send-Out)           Procedures Ordered:       REFER  Referral to Specialist or Other Facility  (Send-Out)         REFER  Referral to Specialist or Other Facility  (Send-Out)                   PLAN: Will RTC friday to discuss DM 2 and BP.          Hematochezia Digital rectal exam positive for slightly reddish brown stool on glove. Will refer to Dr. Mariscal for colonoscopyWill get CBC today and if hgb is very low pt may need more emergent evaluation.         REFERRALS:  Referral initiated to a general surgeon ( Dr. Michael Mariscal; a colonoscopy ).            Orders:       REFER  Referral to Specialist or Other Facility  (Send-Out)            HTN Elevated today, consider medication adjustment at f/u apt in 5 days. Pt has been referred to nephrology but has missed at least 2 appointment due to miscommunication issues.          Type 2 DM Will get basic labs including A1c today and pt will RTC in 5 days to discuss DM 2 further.     LABORATORY:  Labs ordered to be performed today include CBC, Diabetes Panel  2;CMP, A1C, Lipid, Microalbumin:Creatinine Ratio, and TSH.            Orders:       [id number]  BDCBC - [facility] CBC with 3 part diff  (Send-Out)         [id number]  DIAB2 - [facility] CMP A1C LIPID AND MICRO ALBUM CR RATIO: [id number]  (Send-Out)         34927  TSH - [facility] TSH  (Send-Out)            Diastolic heart failure, chronic Pt referred to cardiology to discuss diastolic heart failure more.         REFERRALS:  Referral initiated to a cardiologist ( Dr. Milton Zuniga, [facility] ).            Prescriptions:       Refill of: Furosemide 40mg Tablets 1 by mouth  daily  #30 (Thirty) tablet(s) Refills: 1           Orders:       REFER  Referral to Specialist or Other Facility  (Send-Out)               CHARGE CAPTURE           **Please note: ICD descriptions below are intended for billing purposes only and may not represent clinical diagnoses**        Primary Diagnosis:         578.1 Hematochezia            K92.1    Melena              Orders:          [id number]   Office/outpatient visit; established patient, level 4  (In-House)           401.1 HTN            I10    Essential (primary) hypertension    250.00 Type 2 DM            E11.8    Type 2 diabetes mellitus with unspecified complications    428.32 Diastolic heart failure, chronic            I50.32    Chronic diastolic (congestive) heart failure

## 2021-05-18 NOTE — PROGRESS NOTES
Delgado Thomas R. 1935     Office/Outpatient Visit    Visit Date: Mon, Apr 1, 2019 10:21 am    Provider: Tommy Garduno MD (Assistant: Gillian Lara LPN)    Location: [facility]        Electronically signed by Tommy Garduno MD on  04/01/2019 02:58:56 PM                             SUBJECTIVE:        CC:     Mr. Delgado, is a 83 year old Black or  male.  He is here today following a transition of care from a skilled nursing facility pt was discharged from [facility] to [facility] on 3/14/19 & discharged on 3/27/19 and The patient was admitted for stroke.  pt was taken off hctz, metoprolol, colace, & glipizide and gabapentin was decreased to 100mg to QHS         HPI:     Pt went to [facility] ER on 3/9. He got up in th early morning and couldn't walk. Pt alerted nursing staff and pt was trasported by ambulance to [facility], he was helicoptered to , where he was admitted from 3/9 to 3/14. From 3/14 to 3/27 he was admitted to [facility]. Pt is going good, he is back walking and talking good. Son is concerned about getting his medicines straightened out. Pt is supposed to be doing PT at [facility].     - On the day of stroke he had a right facial droop, swelling, and difficulty walking and talking. Sx resolved gradually over the next couple days to a week.     Pt is on lantus 50 units BID, novolog 5 units TID before meals, and glipizide 10 mg qd. He is not on metformin 2/2 CKD stage IV. Well controlled in during hospitalization and in rehab. Checks blood sugar BID since discharge 5 days ago and its been up and down. As low as 74, this morning 132.     He is on senna and docusate for constipation. These were stopped by hospital.     Pt had an appointment today with Dr. Frausto but were unable to keep this as it coincided with our appointment. They will try to reschedule.     ROS:     CONSTITUTIONAL:  Negative for fatigue and fever.      EYES:  Negative for blurred vision.       E/N/T:  Negative for diminished hearing and nasal congestion.      CARDIOVASCULAR:  Negative for chest pain and palpitations.      RESPIRATORY:  Negative for recent cough and dyspnea.      GASTROINTESTINAL:  Negative for abdominal pain, constipation, diarrhea, nausea and vomiting.      MUSCULOSKELETAL:  Positive for limb pain ( left leg pain ).   Negative for arthralgias or myalgias.      NEUROLOGICAL:  Positive for paresthesias.   Negative for weakness.      PSYCHIATRIC:  Negative for anxiety, depression and sleep disturbance.          PMH/FMH/SH:     Last Reviewed on 4/01/2019 02:53 PM by Tommy Garduno    Past Medical History:             PAST MEDICAL HISTORY         Colon cancer         CURRENT MEDICAL PROVIDERS:    Nephrologist         Surgical History:         Positive for    Cataract Removal: bilateral; ;     Positive for    Colonoscopy ( 2016;; );         Family History:     Family Medical History Unremarkable         Social History:     Occupation:    Retired         Tobacco/Alcohol/Supplements:     Last Reviewed on 4/01/2019 02:53 PM by Tommy Garduno    Tobacco: He has a past history of cigarette smoking; quit date:  1975.  Non-drinker     Caffeine:  He admits to consuming caffeine via coffee ( 2 servings per day ).          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         Current Medications:     Last Reviewed on 4/01/2019 02:53 PM by Tommy Graduno    Accu-Chek Anna Plus Test Strips  Reagent Strips check blood sugar bid  DX E11.9     Amlodipine  10mg Tablet 1 tab daily     BD Ultra Fine II Lancet  Lancet check BS bid  DX: E11.9     Hydrochlorothiazide (HCTZ) 25mg Tablet 1 tab daily     Lisinopril 40mg Tablet 1 tab daily     Metoprolol Succinate 25mg Tablets, Extended Release 1 tablet BID     Glipizide 10mg Tablets 1 tab bid     BD Ultra-Fine Mini Pen Needle 31G x 3/16\"  Pen Needle Use BID as directed     Docusate Sodium 100mg Capsules Take 1 capsule bid for constipation PRN     Senna 8.6mg Tablet Take 2 tablets po BID, prn for constipation HOLD FOR DIARRHEA     Clonidine HCl 0.2mg Tablets ONE BID     Lantus SoloSTAR 100units/1ml Injection 50 units SC BID     Hydralazine HCl 100mg Tablet Take 1 tablet(s) by mouth tid     Aspirin (ASA) 81mg Chewable Tablet 1 a day     Atorvastatin Calcium 80mg Tablet 1 tab hs     Carvedilol 6.25mg Tablet 1 tab bid     Furosemide 20mg Tablets 1 tab daily     NovoLog FlexPen 100units/1ml Injection 5 units before each meal         OBJECTIVE:        Vitals:         Current: 4/1/2019 10:38:31 AM    Ht:  5 ft, 6 in;  Wt: 224.6 lbs;  BMI: 36.3    T: 98.3 F (tympanic);  BP: 134/52 mm Hg (left arm, sitting);  P: 58 bpm (left arm (BP Cuff), sitting);  sCr: 2.73 mg/dL;  GFR: 21.53        Exams:     PHYSICAL EXAM:     GENERAL: Vitals recorded well developed, well nourished;  well groomed;  no apparent distress;     RESPIRATORY: normal respiratory rate and pattern with no distress; normal breath sounds with no rales, rhonchi, wheezes or rubs;     CARDIOVASCULAR: normal rate; rhythm is regular;  a " systolic murmur is noted: it is grade 2/6;  2+ pedal edema;     GASTROINTESTINAL: nontender;     MUSCULOSKELETAL: gait: slowed;  muscle strength: 5/5 in all major muscle groups;     NEUROLOGIC: mental status: alert and oriented x 3; cranial nerves II-XII grossly intact; GROSSLY INTACT     PSYCHIATRIC: appropriate affect and demeanor; normal speech pattern;         ASSESSMENT           436   I67.82  Acute stroke              DDx:     250.00   E11.8  Type 2 DM              DDx:     564.01   K59.00  Constipation (chronic)              DDx:     585.3   N18.3  Chronic kidney disease, Stage III (moderate)              DDx:         ORDERS:         Meds Prescribed:       Refill of: Docusate Sodium 100mg Capsules Take 1 capsule bid for constipation PRN  #60 (Sixty) capsule(s) Refills: 2       Refill of: Senna 8.6mg Tablet Take 2 tablets po BID, prn for constipation HOLD FOR DIARRHEA  #60 (Sixty) tablet(s) Refills: 2       Refill of: Glipizide 10mg Tablets 1 tab bid  #60 (Sixty) tablet(s) Refills: 2       Refill of: Aspirin (ASA) 325mg Tablet 1 tab daily  #90 (Ninety) tablet(s) Refills: 1         Procedures Ordered:       REFER  Referral to Specialist or Other Facility  (Send-Out)                   PLAN:          Acute stroke Pt is doing very well. He has no apparent deficits on physical exam, muscle strength is 5/5 in all fields tested. CN exam is normal as well. Will refer for home health PT/OT/ST and neurology.         REFERRALS:  Referral initiated to home health services ( for evaluation of pPT/OT/ST s/p admission for stroke ) and a neurologist.            Prescriptions:       Refill of: Aspirin (ASA) 325mg Tablet 1 tab daily  #90 (Ninety) tablet(s) Refills: 1           Orders:       REFER  Referral to Specialist or Other Facility  (Send-Out)            Type 2 DM Reiterated pt is no longer on metfromin due to CKD. Glipizide is restrarted. If blood sugars become too low, reduce lantus from 50 to 45 units BID.            Prescriptions:       Refill of: Glipizide 10mg Tablets 1 tab bid  #60 (Sixty) tablet(s) Refills: 2          Constipation (chronic)           Prescriptions:       Refill of: Docusate Sodium 100mg Capsules Take 1 capsule bid for constipation PRN  #60 (Sixty) capsule(s) Refills: 2       Refill of: Senna 8.6mg Tablet Take 2 tablets po BID, prn for constipation HOLD FOR DIARRHEA  #60 (Sixty) tablet(s) Refills: 2          Chronic kidney disease, Stage III (moderate) Will try to help reschedule patient's appointment with nephrology.             CHARGE CAPTURE           **Please note: ICD descriptions below are intended for billing purposes only and may not represent clinical diagnoses**        Primary Diagnosis:         436 Acute stroke            I67.82    Cerebral ischemia              Orders:          [id number]   Office/outpatient visit; established patient, level 4  (In-House)           250.00 Type 2 DM            E11.8    Type 2 diabetes mellitus with unspecified complications    564.01 Constipation (chronic)            K59.00    Constipation, unspecified    585.3 Chronic kidney disease, Stage III (moderate)            N18.3    Chronic kidney disease, stage 3 (moderate)

## 2021-05-18 NOTE — PROGRESS NOTES
Jr. Delgado Thomas 1935     Office/Outpatient Visit    Visit Date: Fri, Sep 28, 2018 09:48 am    Provider: Ezekiel Nava MD (Assistant: Eloisa Lawrence MA)    Location: [facility]        Electronically signed by Ezekiel Nava MD on  09/28/2018 03:07:24 PM                             SUBJECTIVE:        CC:     Mr. Danny Jr. is a 83 year old Black or  male.  1 month follow up patient has broought in all of his meds  PATIENT IS TAKING GLIPIZIDE 10MG 1 DAY )         HPI:         Mr. Danny Jr. presents with iDDM.  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  Compliance with treatment has been good.  Current meds include an oral hypoglycemic and insulin.  He reports home blood glucose readings have been excellent, with average fasting glucoses running <120 mg/dL.  Most recent lab results include.  Hemoglobin A1c:  8.6 (%) (08/28/2018), Microalbumin, Urine, rand:  74.2 (mg/L) (02/01/2018) In regard to preventative care, his last ophthalmology exam was in 2017.          Dx with hTN; his current cardiac medication regimen includes a diuretic, a beta-blocker, an ACE inhibitor, a calcium channel blocker, and an alpha blocker.  Mr. Danny Jr. does not check his blood pressure other than at his clinic appointments.  Compliance with treatment has been good.      ROS:     CONSTITUTIONAL:  Negative for fatigue.      EYES:  Negative for blurred vision.      CARDIOVASCULAR:  Positive for pedal edema ( comes and goes ).   Negative for chest pain, orthopnea or tachycardia.      RESPIRATORY:  Negative for recent cough and dyspnea.      GASTROINTESTINAL:  Negative for abdominal pain, constipation, diarrhea, heartburn, nausea and vomiting.      GENITOURINARY:  Positive for nocturia and (chronic) urge incontinence.   Negative for dysuria or change in urine stream.      MUSCULOSKELETAL:  Positive for arthralgias (chronic L knee).      NEUROLOGICAL:  Negative for headaches and weakness.       "ALLERGIC/IMMUNOLOGIC:  Negative for seasonal allergies.      PSYCHIATRIC:  Negative for depression and feelings of stress.          PMH/FMH/SH:     Last Reviewed on 9/28/2018 09:55 AM by Ezekiel Nava    Past Medical History:             PAST MEDICAL HISTORY         Colon cancer         CURRENT MEDICAL PROVIDERS:    Nephrologist         Surgical History:         Positive for    Cataract Removal: bilateral; ;     Positive for    Colonoscopy ( 2016;; );         Family History:     Family Medical History Unremarkable         Social History:     Occupation:    Retired         Tobacco/Alcohol/Supplements:     Last Reviewed on 9/28/2018 09:54 AM by Ezekiel Nava    Tobacco: He has a past history of cigarette smoking; quit date:  1975.  Non-drinker     Caffeine:  He admits to consuming caffeine via coffee ( 2 servings per day ).          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         Current Medications:     Last Reviewed on 9/28/2018 10:08 AM by Ezekiel Nava    Amlodipine  10mg Tablet 1 tab daily     BD Ultra-Fine Mini Pen Needle 31G x 3/16\"  Pen Needle Use BID as directed     Clonidine HCl 0.2mg Tablets 1/2 to 1 tab po BID     Glipizide 10mg Tablets 1 tab bid     Hydrochlorothiazide (HCTZ) 25mg Tablet 1 tab daily     Lisinopril 40mg Tablet 1 tab daily     Accu-Chek Anna Plus Test Strips  " Reagent Strips check blood sugar bid  DX E11.9     BD Ultra Fine II Lancet  Lancet check BS bid  DX: E11.9     Docusate Sodium 100mg Capsules Take 1 capsule bid for constipation PRN     Lantus SoloSTAR 100units/1ml Injection 50 units BID     Metoprolol Succinate 25mg Tablets, Extended Release 1 tablet BID     Senna 8.6mg Tablet Take 2 tablets po BID, prn for constipation HOLD FOR DIARRHEA         OBJECTIVE:        Vitals:         Current: 9/28/2018 9:55:41 AM    Ht:  5 ft, 6 in;  Wt: 221.6 lbs;  BMI: 35.8    T: 98.7 F (tympanic);  BP: 160/49 mm Hg (left arm, sitting);  P: 67 bpm (left arm (BP Cuff), sitting);  sCr: 2.2 mg/dL;  GFR: 26.57        Exams:     PHYSICAL EXAM:     GENERAL: Vitals recorded well developed, well nourished;  well groomed;  no apparent distress;     NECK: trachea is midline; thyroid is non-palpable;     RESPIRATORY: normal respiratory rate and pattern with no distress; normal breath sounds with no rales, rhonchi, wheezes or rubs;     CARDIOVASCULAR: normal rate; rhythm is regular;  a systolic murmur is noted: it is grade 2/6 and NEG ECHO 2017;  trace pedal edema;     GASTROINTESTINAL: nontender;     LYMPHATIC: no enlargement of cervical or facial nodes;     MUSCULOSKELETAL: BILATERAL VALGUS DEFORMITY OF TIB/FIB.;     NEUROLOGIC: GROSSLY INTACT         ASSESSMENT           250.01   E11.9  IDDM              DDx:     401.1   I10  HTN              DDx:         ORDERS:         Meds Prescribed:       Refill of: Accu-Chek Anna Plus Test Strips (Glucose Reagent Blood Test Strips) Reagent Strips check blood sugar bid  DX E11.9  #100 ([facility] [place]) strip(s) Refills: 5       Refill of: BD Ultra Fine II Lancet (Lancet ) Lancet check BS bid  DX: E11.9  #100 ([facility] [place]) lancet Refills: 5                 PLAN:          IDDM         MEDICATIONS: (no change to current medication regimen)     FOLLOW-UP: Schedule a follow-up visit in 2 months. LABS AT NEXT VISIT           Prescriptions:       Refill of:  Accu-Chek Anna Plus Test Strips (Glucose Reagent Blood Test Strips) Reagent Strips check blood sugar bid  DX E11.9  #100 ([facility] [place]) strip(s) Refills: 5       Refill of: BD Ultra Fine II Lancet (Lancet ) Lancet check BS bid  DX: E11.9  #100 ([facility] [place]) lancet Refills: 5          HTN     AS ABOVE             Patient Recommendations:        For  IDDM:     Schedule a follow-up visit in 2 months.              CHARGE CAPTURE           **Please note: ICD descriptions below are intended for billing purposes only and may not represent clinical diagnoses**        Primary Diagnosis:         250.01 IDDM            E11.9    Type 2 diabetes mellitus without complications              Orders:          35132   Office/outpatient visit; established patient, level 4  (In-House)           401.1 HTN            I10    Essential (primary) hypertension

## 2021-05-18 NOTE — PROGRESS NOTES
"Jr. Delgado Thomas 1935     Office/Outpatient Visit    Visit Date: Tue, Aug 28, 2018 09:31 am    Provider: Ezekiel Nava MD (Assistant: Fern Rosas MA)    Location: [facility]        Electronically signed by Ezekiel Nava MD on  08/28/2018 01:19:43 PM                             SUBJECTIVE:        CC:     Mr. Danny Jr. is a 83 year old Black or  male.  This is a follow-up visit.  CHECK UP; PT STATES MEDS HAVE CHANGED, DOES NOT HAVE CURRENT MED LIST, BUT DOES SAY LANTUS IS DOWN TO 40UNITS ONCE DAILY         HPI: Mr. Delgado is here for a diabetes follow up.  August 18th he moved into assisted living facility from [facility], [facility], where he was for 2-3 months after being in the hospital at [place].  Son reports that when he went into the hospital he was weak and thin, but since then he has gained 20-30 lbs back.  Son reports his dad's condition is \"night and day\".  Pt reports good appetite and chronic insomnia.  He is using his walker and has been walking around the halls at his assisted living.   Last fall was while in the hospital 4 months ago and he denies hitting his head.   No recent falls.  He hasn't had a glucometer for the past week but it is scheduled to arrive today.  Denies lightheadedness or dizziness.  Lantus has decreased to 40 units a day from 50 units twice a day.  His son attributes this change to assisted living having a tighter regulation of his meals and eating fewer sweets.        Mr. Delgado has a h/o HTN.  Has not been taking his BP at [facility].  His systolic BP in the nursing home was in the 150s.  Denies chest pain, SOA, headache.        Has a h/o rectal bleeding, but this improved after starting stool softener and laxative in the nursing home.  Denies abdominal pain.  Sometimes has diarrhea when taking stool softener and laxative daily.                    Mr. Danny Jr. presents with iDDM.  Mr. Danny Jr. has type 2, insulin " requiring diabetes.  Compliance with treatment has been good.  Current meds include an oral hypoglycemic and insulin.  He does not perform home blood glucose monitoring.  He does not know results of any recent lab monitoring.  In regard to preventative care, his last ophthalmology exam was in 2017.  WAS IN THE HOSPITAL AND THE NURSING HOME, NOW IN ASSISTED LIVING FACILITY         Concerning hTN, his current cardiac medication regimen includes HE IS NOT SURE WHAT MEDS HE IS CURRENTLY TAKING..  He has not kept a blood pressure diary, but states that pressures have been well controlled.  Compliance with treatment has been good.      ROS:     CONSTITUTIONAL:  Negative for fatigue.      EYES:  Negative for blurred vision.      CARDIOVASCULAR:  Positive for pedal edema ( comes and goes ).   Negative for chest pain, orthopnea or tachycardia.      RESPIRATORY:  Positive for persistent cough ( typically dry; has improved recently ).   Negative for dyspnea.      GASTROINTESTINAL:  Negative for abdominal pain, constipation, diarrhea, heartburn, nausea and vomiting.      GENITOURINARY:  Positive for nocturia and (chronic) urge incontinence.   Negative for dysuria or change in urine stream.      MUSCULOSKELETAL:  Positive for arthralgias (chronic L knee).      INTEGUMENTARY:  Positive for extremely dry skin (face and scalp, using topical cream).      NEUROLOGICAL:  Negative for headaches and weakness.      ALLERGIC/IMMUNOLOGIC:  Negative for seasonal allergies.      PSYCHIATRIC:  Negative for depression and feelings of stress.          PMH/FMH/SH:     Last Reviewed on 8/28/2018 09:53 AM by Ezekiel Nava    Past Medical History:             PAST MEDICAL HISTORY         Colon cancer         CURRENT MEDICAL PROVIDERS:    Nephrologist         Surgical History:         Positive for    Cataract Removal: bilateral; ;     Positive for    Colonoscopy ( 2016;; );         Family History:     Family Medical History Unremarkable          Social History:     Occupation:    Retired         Tobacco/Alcohol/Supplements:     Last Reviewed on 8/28/2018 09:53 AM by Ezekiel Nava    Tobacco: He has a past history of cigarette smoking; quit date:  1975.  Non-drinker     Caffeine:  He admits to consuming caffeine via coffee ( 2 servings per day ).          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         Current Medications:     Last Reviewed on 3/02/2018 09:28 AM by Ezekiel Nava    Glipizide 10mg Tablets 1 tab bid     Accu-Chek Anna Plus Meter  Kit check bg bid, may sub for generic glucose monitor for E11.9     Amlodipine  10mg Tablet 1 tab daily     BD Ultra-Fine 6mm Needle 31G Insulin Syringe 1ml Syringe use with lantus daily  DX E11.9     Clonidine HCl 0.2mg Tablets 1/2 to 1 tab po BID     Hydrochlorothiazide (HCTZ) 25mg Tablet 1 tab daily     Lantus 100units/1ml Injection 50 units BID     Lisinopril 40mg Tablet 1 tab daily     Metoprolol Tartrate 25mg Tablet Take 1 tablet(s) by mouth twice daily     Accu-Chek Anna Plus Test Strips  Reagent Strips check blood sugar bid  DX E11.9     BD Ultra Fine II Lancet  Lancet check BS bid  DX: E11.9         OBJECTIVE:        Vitals:         Current: 8/28/2018 9:39:42 AM    Ht:  5 ft, 6 in;  Wt: 210 lbs;  BMI: 33.9    T: 97.8 F (tympanic);  BP: 156/67 mm Hg (left arm, sitting);  P: 59 bpm (left arm (BP Cuff), sitting);   sCr: 2.38 mg/dL;  GFR: 24.00        Exams:     PHYSICAL EXAM:     GENERAL: Vitals recorded well developed, well nourished;  well groomed;  no apparent distress;     NECK: trachea is midline; thyroid is non-palpable;     RESPIRATORY: normal respiratory rate and pattern with no distress; normal breath sounds with no rales, rhonchi, wheezes or rubs;     CARDIOVASCULAR: normal rate; rhythm is regular;  a systolic murmur is noted: it is grade 2/6 and NEG ECHO 2017;  trace pedal edema;     GASTROINTESTINAL: nontender;     LYMPHATIC: no enlargement of cervical or facial nodes;     MUSCULOSKELETAL: BILATERAL VALGUS DEFORMITY OF TIB/FIB.;     NEUROLOGIC: GROSSLY INTACT         ASSESSMENT           250.01   E11.9  IDDM              DDx:     401.1   I10  HTN              DDx:     280.9   D50.8  Iron deficiency anemia              DDx:         ORDERS:         Lab Orders:       [id number]  DIAB1 - [facility] LIPID,CMP, A1C: 98303, 09727, 35766  (Send-Out)         APPTO  Appointment need  (In-House)         45881  TSH - [facility] TSH  (Send-Out)         71916  BDCBC - [facility] CBC with 3 part diff  (Send-Out)         39854  IRONP - [facility] Iron and TIBC  (Send-Out)                   PLAN:          IDDM     LABORATORY:  Labs ordered to be performed today include Diabetes Panel 1; CMP, Lipid, A1C.      FOLLOW-UP: Schedule a follow-up visit in 1 month..  WE WILL GET MED LIST AND LAST LABS FROM THE NURSING HOME.            Orders:       [id number]  DIAB1 - [facility] LIPID,CMP, A1C: 41625, 93258, 11226  (Send-Out)         APPTO  Appointment need  (In-House)            HTN     LABORATORY:  Labs ordered to be performed today include TSH.            Orders:       [id number]  TSH - [facility] TSH  (Send-Out)            Iron deficiency anemia     LABORATORY:  Labs ordered to be performed today include Anemia profile CBC Serum iron.            Orders:       [id number]  BDCBC - [facility] CBC with 3 part diff  (Send-Out)         94296  IRONP - [facility] Iron and TIBC  (Send-Out)               Patient  Recommendations:        For  IDDM:     Schedule a follow-up visit in 1 month.                APPOINTMENT INFORMATION:        Monday Tuesday Wednesday Thursday Friday Saturday Sunday            Time:___________________AM  PM   Date:_____________________             CHARGE CAPTURE           **Please note: ICD descriptions below are intended for billing purposes only and may not represent clinical diagnoses**        Primary Diagnosis:         250.01 IDDM            E11.9    Type 2 diabetes mellitus without complications              Orders:          [id number]   Office/outpatient visit; established patient, level 4  (In-House)             APPTO   Appointment need  (In-House)           401.1 HTN            I10    Essential (primary) hypertension    280.9 Iron deficiency anemia            D50.8    Other iron deficiency anemias

## 2021-05-18 NOTE — PROGRESS NOTES
DannyCarlos  1935     Office/Outpatient Visit    Visit Date: Thu, Oct 1, 2020 11:28 am    Provider: Tommy Garduno MD (Assistant: Phuong Jenkins MA)    Location: [facility]        Electronically signed by Tommy Garduno MD on  10/29/2020 03:20:24 PM                             Subjective:        CC: Follow up, pt recently hospitalized at [facility] from 8/29 to 9/9. Dr Garduno notified that our medication list and pts meds were different. /mnp    HPI:       Pt hospitalized at [facility] from 8/29 - 9/9. He presented to [facility] for right sided weakness and was found to have hypogycemia, UTI, and ESBL bacteremia. ID was c/s's and they recommended meropenan until 9/14. Pt also had an RANJIT on CKD and cr returned to baseline of 2 during hospitalization. Prior to then pt was hospitalized 3 times at [facility] for PNA. On 9/9 he was discharged to [facility] rehab to complete antibiotics and for PT. On 9/28 he was transferred from rehab to [facility]. Pt reports he's been fine since leaving rehab, although not quite back to his old self. Not quite as strong as he used to be.      BP today is 150/54 with a HR of 65. He is on Imdur 30 mg qd, lisinopril 40 mg qd, coreg 6.25, amlodipine 10 mg qd, hydralazine 50 mg TID, and clonidine 0.1 mg TID. He is also on bummex 2 mg qd.      A1c was 7.5 on 5/27/20. He is on Lantus 40 units qd, novolog 5 units TID. Glipizide 10 mg BID and januvia 50 mg qd were d/c'd during hospitalization at [facility]. He check glucose BID and its often low in the morning (77 this morning); at night it varies, maybe low 300s.      As above, pt was hospitalized at [facility] from 8/29 to 9/9 for ESBL bacteremia. Cr level was very elevated but according to d/c summary it returned to baseline of 2 by discharge.      Pt has iron deficiency anemia, hgb has been 8.7 -> 11.1 from 12/17 -> 5/27. Fe was 45 on 12/7/19.       Pt has peeling of skin of feet, worse on heels and worse on  right.           PHQ-9 Depression Screening: Completed form scanned and in chart; Total Score 0     ROS:     CONSTITUTIONAL:  Positive for fatigue ( moderate ).   Negative for fever.      EYES:  Positive for lost vision 1 month ago in left eye.      E/N/T:  Negative for diminished hearing and nasal congestion.      CARDIOVASCULAR:  Negative for chest pain and palpitations.      RESPIRATORY:  Positive for dyspnea ( with moderate exertion ).   Negative for recent cough.      GASTROINTESTINAL:  Positive for anorexia ( decreased appetite ), diarrhea and change in stool caliber.   Negative for abdominal pain, constipation, hematochezia ( resolved ), nausea or vomiting.      MUSCULOSKELETAL:  Negative for arthralgias and myalgias.      INTEGUMENTARY:  Positive for fungal nail infection and (toenails) peeling skin on feet bilaterally.      NEUROLOGICAL:  Positive for paresthesias and weakness ( generalized ).      PSYCHIATRIC:  Negative for anxiety, depression and sleep disturbance.          Past Medical History / Family History / Social History:         Last Reviewed on 10/29/2020 03:16 PM by Tommy Garduno    Past Medical History:             PAST MEDICAL HISTORY         Chronic Renal Failure: Stage 4;     Colon cancer         CURRENT MEDICAL PROVIDERS:    Nephrologist: Dr. Frausto         PREVENTIVE HEALTH MAINTENANCE             COLORECTAL CANCER SCREENING:; colonoscopy with the following abnormalities noted-- 1 tubular adenoma and Polyp(s); 6/17/19 - Dr. Mariscal         Surgical History:         Positive for    Cataract Removal: bilateral; ;     Positive for    Colonoscopy ( 2016;; );         Family History:     Family Medical History Unremarkable         Social History:     Occupation:    Retired         Tobacco/Alcohol/Supplements:     Last Reviewed on 10/29/2020 03:16 PM by Tommy Garduno    Tobacco: He has a past history of cigarette smoking; quit date:  1975.  Non-drinker     Caffeine:  He admits to consuming  caffeine via coffee ( 2 servings per day ).          Substance Abuse History:     Last Reviewed on 10/29/2020 03:16 PM by Tommy Garduno    None         Mental Health History:     Last Reviewed on 10/29/2020 03:16 PM by Tommy Garduno        Communicable Diseases (eg STDs):     Last Reviewed on 10/29/2020 03:16 PM by Tommy Garduno        Current Problems:     Last Reviewed on 10/29/2020 03:16 PM by Tommy Garduno    Type 2 diabetes mellitus with diabetic neuropathy, unspecified    Essential (primary) hypertension    Other atherosclerosis of native arteries of extremities, unspecified extremity    Cardiac murmur, unspecified    Other iron deficiency anemias    Constipation, unspecified    Type 2 diabetes mellitus with unspecified complications    Hereditary and idiopathic neuropathy, unspecified    Other long term (current) drug therapy    Cerebral ischemia    Chronic diastolic (congestive) heart failure    Chronic kidney disease, stage 4 (severe)    Anemia in chronic kidney disease    Unqualified visual loss, right eye, normal vision left eye    Encounter for follow-up examination after completed treatment for conditions other than malignant neoplasm    Personal history of other malignant neoplasm of large intestine    Sepsis due to Escherichia coli [E. coli]    Encounter for screening for depression    Zoster with other complications    Tinea pedis    Extended spectrum beta lactamase (ESBL) resistance        Immunizations:     None        Allergies:     Last Reviewed on 10/29/2020 03:16 PM by Tommy Garduno    No Known Allergies.        Current Medications:     Last Reviewed on 10/29/2020 03:16 PM by Tommy Garduno    lisinopril 40 mg oral tablet [TAKE 1 TABLET BY MOUTH TWICE DAILY]    amLODIPine 10 mg oral tablet [TAKE 1 TABLET BY MOUTH ONCE DAILY]    Accu-Chek Anna Plus test strips  [CHECK BLOOD SUGAR TWICE DAILY]    BD Ultra Fine II Lancet  Lancet [check BS bid  DX: E11.9]    Inés Romero U-100  "Insulin 100 unit/mL (3 mL) subcutaneous Insulin Pen [INJECT 40 UNITS SUB-Q TWICE DAILY]    Docusate Sodium 100 mg oral capsule [Take 1 capsule bid for constipation PRN]    NovoLOG Flexpen U-100 Insulin 100 unit/mL (3 mL) subcutaneous Insulin Pen [7 units before each meal]    carvedilol 6.25 mg oral tablet [TAKE 1 TABLET BY MOUTH TWICE DAILY]    atorvastatin 20 mg oral tablet [1 po q hs ]    aspirin 325 mg oral tablet, delayed release (enteric coated) [TAKE 1 TABLET BY MOUTH EVERY DAY]    isosorbide mononitrate 30 mg oral Tablet, Extended Release 24 hr [take 1 tablet (30 mg) by oral route once qHS]    Januvia 50 mg oral tablet [1 tab daily]    terbinafine HCl 250 mg oral tablet [take 1 tablet (250 mg) by oral route once daily]    furosemide 20 mg oral tablet [take 1 tablet (20 mg) by oral route once per day]    Easy Touch 32 gauge x 3/16\" needle  [USE TWICE DAILY WITH LANTUS INJECTIONS]    gabapentin 300 mg oral capsule [take 1 capsule (300 mg) by oral route 3 times per day]    bumetanide 2 mg oral tablet [TAKE ONE TABLET BY MOUTH EVERY DAY]    hydrALAZINE 50 mg oral tablet [TAKE ONE TABLET BY MOUTH THREE TIMES DAILY]    cloNIDine HCl 0.1 mg oral tablet [TAKE ONE TABLET THREE TIMES DAILY]        Objective:        Vitals:         Current: 10/1/2020 11:45:20 AM    Ht:  5 ft, 6 in;  Wt: 208.6 lbs;  BMI: 33.7T: 97 F (temporal);  BP: 150/54 mm Hg (left arm, sitting);  P: 65 bpm (left arm (BP Cuff), sitting);  sCr: 2.46 mg/dL;  GFR: 22.39        Exams:     PHYSICAL EXAM:     GENERAL: Vitals recorded well developed, well nourished;  well groomed;  no apparent distress;     EYES: left iris is cloudy and reflective; PERRL, EOMI     NECK: range of motion is normal;     RESPIRATORY: normal respiratory rate and pattern with no distress; normal breath sounds with no rales, rhonchi, wheezes or rubs;     CARDIOVASCULAR: normal rate; rhythm is regular;  a systolic murmur is noted: it is grade 2/6;  trace pedal edema;     " GASTROINTESTINAL: nontender;     SKIN: peeling of skin on soles of feet bilaterally;     MUSCULOSKELETAL: gait: slowed and uses a walker;  normal overall tone     NEUROLOGIC: mental status: alert and oriented x 3; GROSSLY INTACT     PSYCHIATRIC: appropriate affect and demeanor; normal speech pattern;         Assessment:         Z16.12   Extended spectrum beta lactamase (ESBL) resistance       I10   Essential (primary) hypertension       E11.8   Type 2 diabetes mellitus with unspecified complications       N18.4   Chronic kidney disease, stage 4 (severe)       D50.8   Other iron deficiency anemias       B35.3   Tinea pedis       Z13.31   Encounter for screening for depression           ORDERS:         Meds Prescribed:       [New Rx] Athlete's Foot 2 % Topical Aerosol Powder [apply spray to the affected area(s) by topical route 2 times per day in the morning and evening], #130 (one hundred and thirty) grams, Refills: 0 (zero)       [Refilled] ferrous sulfate 325 mg (65 mg iron) oral tablet [take 1 tablet (325 mg) by oral route 2 times per day with vitamin C], #180 (one hundred and eighty) tablets, Refills: 2 (two)       [Refilled] Vitamin C 500 mg oral tablet [Take 1 tab PO BID with ferrous sulfate], #180 (one hundred and eighty) tablets, Refills: 2 (two)         Lab Orders:       01959  BDCBC - [facility] CBC with 3 part diff  (Send-Out)            78705  COMP - [facility] Comp. Metabolic Panel  (Send-Out)            42396  TSH - [facility] TSH  (Send-Out)            12616  A1CEG - [facility] Hemoglobin A1C  (Send-Out)            59269  NTBNP - [facility] B-Type Natriurectic peptide  (Send-Out)            90180  FERR - [facility] Ferritin Serum  (Send-Out)            40546  FOL - [facility] Folate; folic acid serum  (Send-Out)            18923  RETEC - [facility] Reticulocyte count  (Send-Out)            61900  IRONP - [facility] Iron and TIBC  (Send-Out)            87064  VB12 - [facility] Vitamin B12  (Send-Out)              Other Orders:         Depression screen negative   (In-House)                      Plan:         Extended spectrum beta lactamase (ESBL) resistanceFortunately this has resolved s/p IV abx during hospitalization.         Essential (primary) hypertensionBP is elevated but pt is doing well and on several different anti-hypertensives. For now, cont Imdur 30 mg qd, lisinopril 40 mg qd, coreg 6.25, amlodipine 10 mg qd, hydralazine 50 mg TID, and clonidine 0.1 mg TID. He is also on bummex 2 mg qd.    LABORATORY:  Labs ordered to be performed today include CBC, Comprehensive metabolic panel, and TSH.            Orders:       [id number]  BDCBC - [facility] CBC with 3 part diff  (Send-Out)            74774  COMP - [facility] Comp. Metabolic Panel  (Send-Out)            85897  TSH - [facility] TSH  (Send-Out)              Type 2 diabetes mellitus with unspecified complicationsGlucose is at times very high but most recent A1c was in well controlled range at 7.5 on 5/27/20. Will recheck today, cont Lantus 40 units qd, novolog 5 units TID.    LABORATORY:  Labs ordered to be performed today include HgbA1C.            Orders:       57423  A1CEG - [facility] Hemoglobin A1C  (Send-Out)              Chronic kidney disease, stage 4 (severe)Will recheck Cr and arrange sooner f/u with nephrology if needed.         Other iron deficiency anemiasCont ferrous sulfate and vitamin C BID, will recheck labs today.     LABORATORY:  Labs ordered to be performed today include Anemia profile ferritin Folate Reticulocyte ct Serum iron Vitamin B12.            Prescriptions:       [Refilled] ferrous sulfate 325 mg (65 mg iron) oral tablet [take 1 tablet (325 mg) by oral route 2 times per day with vitamin C], #180 (one hundred and eighty) tablets, Refills: 2 (two)       [Refilled] Vitamin C 500 mg oral tablet [Take 1 tab PO BID with ferrous sulfate], #180 (one hundred and eighty) tablets, Refills: 2 (two)           Orders:       60594  FERR - [facility] Ferritin Serum  (Send-Out)            61756  FOL - [facility] Folate; folic acid serum   (Send-Out)            03400  RETEC - [facility] Reticulocyte count  (Send-Out)            36966  IRONP - [facility] Iron and TIBC  (Send-Out)            43091  VB12 - [facility] Vitamin B12  (Send-Out)              Tinea pedisPeeling skin on feet appears to be tinea pedis and he is therefore prescribed athlete's foot spray.           Prescriptions:       [New Rx] Athlete's Foot 2 % Topical Aerosol Powder [apply spray to the affected area(s) by topical route 2 times per day in the morning and evening], #130 (one hundred and thirty) grams, Refills: 0 (zero)         Encounter for screening for depression    MIPS PHQ-9 Depression Screening: Completed form scanned and in chart; Total Score 0; Negative Depression Screen           Orders:         Depression screen negative  (In-House)                  Other Orders      37410  NTBNP - [facility] B-Type Natriurectic peptide  (Send-Out)              Charge Capture:         Primary Diagnosis:     Z16.12  Extended spectrum beta lactamase (ESBL) resistance           Orders:      15910  Office/outpatient visit; established patient, level 4  (In-House)              I10  Essential (primary) hypertension     E11.8  Type 2 diabetes mellitus with unspecified complications     N18.4  Chronic kidney disease, stage 4 (severe)     D50.8  Other iron deficiency anemias     B35.3  Tinea pedis     Z13.31  Encounter for screening for depression           Orders:        Depression screen negative  (In-House)                  ADDENDUMS:      ____________________________________    Addendum: 12/17/2020 04:20 PM - Six, Team         Visit Note Faxed to:        User Entered Recipient; Number [phone]

## 2021-05-18 NOTE — PROGRESS NOTES
Jr. Delgado Thomas 1935     Office/Outpatient Visit    Visit Date: Mon, Feb 18, 2019 09:49 am    Provider: Tommy Garduno MD (Assistant: Alondra Magallanes MA)    Location: [facility]        Electronically signed by Tommy Garduno MD on  02/21/2019 12:52:36 PM                             SUBJECTIVE:        CC:     Mr. Danny Jr. is a 83 year old Black or  male.  This is a follow-up visit.  The patient is accompanied into the exam room by his son and Tomas.          HPI:     Last visit was 1 month ago and blood sugars were running very high at that time. Son spoke with nursing home and they have changed to sugar free dessert and lowering carbs He reports drinking soda some but has changed to sprite zero. his sugars are still about 200-300. He is on lantus 50 units BID, glipizide 10 mg BID.     Pt reports burning pain like pins and needles in his anterior shins bilaterally in the setting of long standing poorly controlled DM 2.     ROS:     CONSTITUTIONAL:  Negative for fatigue and fever.      EYES:  Negative for blurred vision.      E/N/T:  Negative for diminished hearing and nasal congestion.      CARDIOVASCULAR:  Negative for chest pain and palpitations.      RESPIRATORY:  Negative for recent cough and dyspnea.      GASTROINTESTINAL:  Negative for abdominal pain, constipation, diarrhea, nausea and vomiting.      MUSCULOSKELETAL:  Positive for limb pain ( left leg pain ).   Negative for arthralgias or myalgias.      NEUROLOGICAL:  Positive for paresthesias.   Negative for weakness.      PSYCHIATRIC:  Negative for anxiety, depression and sleep disturbance.          [facility]/:     Last Reviewed on 2/21/2019 08:26 AM by Tommy Garduno    Past Medical History:             PAST MEDICAL HISTORY         Colon cancer         CURRENT MEDICAL PROVIDERS:    Nephrologist         Surgical History:         Positive for    Cataract Removal: bilateral; ;     Positive for    Colonoscopy (  "2016;; );         Family History:     Family Medical History Unremarkable         Social History:     Occupation:    Retired         Tobacco/Alcohol/Supplements:     Last Reviewed on 2/21/2019 08:26 AM by Tommy Garduno    Tobacco: He has a past history of cigarette smoking; quit date:  1975.  Non-drinker     Caffeine:  He admits to consuming caffeine via coffee ( 2 servings per day ).          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         Current Medications:     Last Reviewed on 2/21/2019 08:26 AM by Tommy Garduno    Clonidine HCl 0.2mg Tablets 1/2 to 1 tab po BID     Metoprolol Succinate 25mg Tablets, Extended Release 1 tablet BID     Glipizide 10mg Tablets 1 tab bid     Amlodipine  10mg Tablet 1 tab daily     Hydrochlorothiazide (HCTZ) 25mg Tablet 1 tab daily     Lisinopril 40mg Tablet 1 tab daily     Accu-Chek Anna Plus Test Strips  Reagent Strips check blood sugar bid  DX E11.9     BD Ultra Fine II Lancet  Lancet check BS bid  DX: E11.9     BD Ultra-Fine Mini Pen Needle 31G x 3/16\"  Pen Needle Use BID as directed     Docusate Sodium 100mg " Capsules Take 1 capsule bid for constipation PRN     Senna 8.6mg Tablet Take 2 tablets po BID, prn for constipation HOLD FOR DIARRHEA     Lantus SoloSTAR 100units/1ml Injection 60 units BID         OBJECTIVE:        Vitals:         Current: 2/18/2019 9:57:00 AM    Ht:  5 ft, 6 in;  Wt: 221.6 lbs;  BMI: 35.8    T: 98.1 F (tympanic);  BP: 144/53 mm Hg (left arm, sitting);  P: 58 bpm (left arm (BP Cuff), sitting);  sCr: 2.44 mg/dL;  GFR: 23.96        Exams:     PHYSICAL EXAM:     GENERAL: Vitals recorded well developed, well nourished;  well groomed;  no apparent distress;     RESPIRATORY: normal respiratory rate and pattern with no distress; normal breath sounds with no rales, rhonchi, wheezes or rubs;     CARDIOVASCULAR: normal rate; rhythm is regular;  a systolic murmur is noted: it is grade 2/6;  2+ pedal edema;     GASTROINTESTINAL: nontender;     MUSCULOSKELETAL: gait: slowed;     NEUROLOGIC: GROSSLY INTACT     PSYCHIATRIC: appropriate affect and demeanor; normal speech pattern;         Lab/Test Results:             Urine temperature:  confirmed (02/18/2019),     All urine drug screen levels confirmed negative:  yes (02/18/2019),     Date and time of last pill:  No controlled medications/PB (02/18/2019),     Performed by:  antonio (02/18/2019),     Collection Time:  1030 (02/18/2019),             ASSESSMENT           250.00   E11.8  Type 2 DM              DDx:     250.60   G60.9  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 Metabolic Panel  (Send-Out)                   PLAN:          Type 2 DM Pt has  poorly controlled DM 2, with A1c 10.4 last month. This has improved a little with elimination of full calorie soda and switching desserts to sugar free. Fortunately these changes have not diminished patient's happiness as he sees no difference in desserts or sodas. Pt and son were counseled that sugars are still high enough to worry about HHS, hospitalization, or shortened life span although patient is 83 and in good spirits and I think he and son understand risks and mostly would like to focus on quality of life which is understandable. Insulin increased from lantus 50 BID to 60 BID in light of glucose still in 200-300 range.     LABORATORY:  Labs ordered to be performed today include CBC and Comprehensive metabolic panel.            Orders:       [id number]  BDC - [facility] CBC with 3 part diff  (Send-Out)         31674  COMP - [facility] Comp. 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     Controlled substance documentation: Ez reviewed; drug screen performed and appropriate; consent is reviewed and signed and on the chart.  He is aware of risk of addiction on this medication, understands that he will need to follow up for a review every 3 months and his medications will be adjusted or decreased as deemed appropriate at each visit.  No history of drug or alcohol abuse.  No concerns about diversion or abuse. He denies side effects related to the medication.  He is aware that he may be called in for pill counts.  The dosing of this medication will be reviewed on a regular basis and reduced if possible..  Ongoing use of a controlled substance is necessary for this patient to have a normal quality of life           Prescriptions:       Gabapentin 300mg Capsules 1 cap TID  #90 (Ninety) capsule(s) Refills: 0          Patient visit for long term (current)  drug use; other     LABORATORY:  Labs ordered to be performed today include Drug screen.            Orders:       50862  Drug test prsmv qual dir optical obs per day  (In-House)               CHARGE CAPTURE           **Please note: ICD descriptions below are intended for billing purposes only and may not represent clinical diagnoses**        Primary Diagnosis:         250.00 Type 2 DM            E11.8    Type 2 diabetes mellitus with unspecified complications              Orders:          03381   Office/outpatient visit; established patient, level 4  (In-House)           250.60 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            G60.9    Hereditary and idiopathic neuropathy, unspecified    V58.69 Patient visit for long term (current) drug use; other            Z79.899    Other long term (current) drug therapy              Orders:          39106   Drug test prsmv qual dir optical obs per day  (In-House)

## 2021-07-01 VITALS
SYSTOLIC BLOOD PRESSURE: 155 MMHG | HEIGHT: 66 IN | WEIGHT: 227 LBS | BODY MASS INDEX: 36.48 KG/M2 | DIASTOLIC BLOOD PRESSURE: 50 MMHG | HEART RATE: 71 BPM | TEMPERATURE: 98.2 F

## 2021-07-01 VITALS
BODY MASS INDEX: 33.11 KG/M2 | WEIGHT: 206 LBS | TEMPERATURE: 97.5 F | HEART RATE: 71 BPM | DIASTOLIC BLOOD PRESSURE: 44 MMHG | HEIGHT: 66 IN | SYSTOLIC BLOOD PRESSURE: 142 MMHG

## 2021-07-01 VITALS
BODY MASS INDEX: 33.3 KG/M2 | DIASTOLIC BLOOD PRESSURE: 58 MMHG | HEART RATE: 74 BPM | WEIGHT: 207.2 LBS | SYSTOLIC BLOOD PRESSURE: 124 MMHG | HEIGHT: 66 IN | TEMPERATURE: 98.5 F

## 2021-07-01 VITALS
DIASTOLIC BLOOD PRESSURE: 67 MMHG | TEMPERATURE: 97.8 F | SYSTOLIC BLOOD PRESSURE: 156 MMHG | BODY MASS INDEX: 33.75 KG/M2 | WEIGHT: 210 LBS | HEIGHT: 66 IN | HEART RATE: 59 BPM

## 2021-07-01 VITALS
TEMPERATURE: 98.3 F | BODY MASS INDEX: 36.45 KG/M2 | HEART RATE: 66 BPM | HEIGHT: 66 IN | DIASTOLIC BLOOD PRESSURE: 51 MMHG | WEIGHT: 226.8 LBS | SYSTOLIC BLOOD PRESSURE: 147 MMHG

## 2021-07-01 VITALS
HEIGHT: 66 IN | HEART RATE: 50 BPM | WEIGHT: 199 LBS | DIASTOLIC BLOOD PRESSURE: 69 MMHG | TEMPERATURE: 96.8 F | SYSTOLIC BLOOD PRESSURE: 121 MMHG | BODY MASS INDEX: 31.98 KG/M2

## 2021-07-01 VITALS
TEMPERATURE: 98.3 F | HEART RATE: 55 BPM | HEIGHT: 66 IN | SYSTOLIC BLOOD PRESSURE: 146 MMHG | BODY MASS INDEX: 36.07 KG/M2 | WEIGHT: 224.4 LBS | DIASTOLIC BLOOD PRESSURE: 49 MMHG

## 2021-07-01 VITALS
SYSTOLIC BLOOD PRESSURE: 178 MMHG | HEIGHT: 66 IN | WEIGHT: 216.6 LBS | BODY MASS INDEX: 34.81 KG/M2 | DIASTOLIC BLOOD PRESSURE: 64 MMHG | HEART RATE: 79 BPM | TEMPERATURE: 98.1 F

## 2021-07-01 VITALS
HEIGHT: 66 IN | BODY MASS INDEX: 30.08 KG/M2 | WEIGHT: 187.2 LBS | TEMPERATURE: 97.5 F | HEART RATE: 117 BPM | SYSTOLIC BLOOD PRESSURE: 160 MMHG | DIASTOLIC BLOOD PRESSURE: 78 MMHG

## 2021-07-01 VITALS
HEART RATE: 67 BPM | HEIGHT: 66 IN | DIASTOLIC BLOOD PRESSURE: 49 MMHG | BODY MASS INDEX: 35.62 KG/M2 | WEIGHT: 221.6 LBS | SYSTOLIC BLOOD PRESSURE: 160 MMHG | TEMPERATURE: 98.7 F

## 2021-07-01 VITALS
HEIGHT: 66 IN | SYSTOLIC BLOOD PRESSURE: 114 MMHG | DIASTOLIC BLOOD PRESSURE: 47 MMHG | HEART RATE: 68 BPM | TEMPERATURE: 97.5 F | WEIGHT: 213.6 LBS | BODY MASS INDEX: 34.33 KG/M2

## 2021-07-01 VITALS
HEIGHT: 66 IN | BODY MASS INDEX: 35.62 KG/M2 | TEMPERATURE: 98.1 F | WEIGHT: 221.6 LBS | DIASTOLIC BLOOD PRESSURE: 53 MMHG | HEART RATE: 58 BPM | SYSTOLIC BLOOD PRESSURE: 144 MMHG

## 2021-07-01 VITALS
SYSTOLIC BLOOD PRESSURE: 134 MMHG | TEMPERATURE: 98.3 F | BODY MASS INDEX: 36.1 KG/M2 | DIASTOLIC BLOOD PRESSURE: 52 MMHG | HEIGHT: 66 IN | WEIGHT: 224.6 LBS | HEART RATE: 58 BPM

## 2021-07-02 VITALS
HEIGHT: 66 IN | SYSTOLIC BLOOD PRESSURE: 153 MMHG | TEMPERATURE: 99.2 F | BODY MASS INDEX: 35.39 KG/M2 | HEART RATE: 69 BPM | WEIGHT: 220.2 LBS | DIASTOLIC BLOOD PRESSURE: 52 MMHG

## 2021-07-02 VITALS
HEART RATE: 75 BPM | HEIGHT: 66 IN | BODY MASS INDEX: 33.94 KG/M2 | DIASTOLIC BLOOD PRESSURE: 64 MMHG | TEMPERATURE: 98.2 F | WEIGHT: 211.2 LBS | SYSTOLIC BLOOD PRESSURE: 156 MMHG

## 2021-07-02 VITALS
WEIGHT: 208.6 LBS | SYSTOLIC BLOOD PRESSURE: 150 MMHG | HEIGHT: 66 IN | DIASTOLIC BLOOD PRESSURE: 54 MMHG | HEART RATE: 65 BPM | BODY MASS INDEX: 33.52 KG/M2 | TEMPERATURE: 97 F